# Patient Record
Sex: FEMALE | Race: WHITE | NOT HISPANIC OR LATINO | URBAN - METROPOLITAN AREA
[De-identification: names, ages, dates, MRNs, and addresses within clinical notes are randomized per-mention and may not be internally consistent; named-entity substitution may affect disease eponyms.]

---

## 2019-01-01 ENCOUNTER — OUTPATIENT (OUTPATIENT)
Dept: OUTPATIENT SERVICES | Facility: HOSPITAL | Age: 44
LOS: 1 days | End: 2019-01-01
Payer: MEDICAID

## 2019-01-01 PROCEDURE — G9001: CPT

## 2019-01-10 ENCOUNTER — EMERGENCY (EMERGENCY)
Facility: HOSPITAL | Age: 44
LOS: 1 days | Discharge: HOME | End: 2019-01-10
Attending: EMERGENCY MEDICINE | Admitting: EMERGENCY MEDICINE
Payer: MEDICAID

## 2019-01-10 VITALS
HEART RATE: 66 BPM | SYSTOLIC BLOOD PRESSURE: 119 MMHG | DIASTOLIC BLOOD PRESSURE: 78 MMHG | RESPIRATION RATE: 14 BRPM | TEMPERATURE: 98 F | OXYGEN SATURATION: 99 %

## 2019-01-10 VITALS
DIASTOLIC BLOOD PRESSURE: 81 MMHG | HEART RATE: 70 BPM | WEIGHT: 121.03 LBS | SYSTOLIC BLOOD PRESSURE: 127 MMHG | OXYGEN SATURATION: 100 % | RESPIRATION RATE: 16 BRPM | TEMPERATURE: 98 F

## 2019-01-10 LAB
ALBUMIN SERPL ELPH-MCNC: 3.6 G/DL — SIGNIFICANT CHANGE UP (ref 3.3–5)
ALP SERPL-CCNC: 78 U/L — SIGNIFICANT CHANGE UP (ref 40–120)
ALT FLD-CCNC: 20 U/L — SIGNIFICANT CHANGE UP (ref 12–78)
ANION GAP SERPL CALC-SCNC: 7 MMOL/L — SIGNIFICANT CHANGE UP (ref 5–17)
AST SERPL-CCNC: 19 U/L — SIGNIFICANT CHANGE UP (ref 15–37)
BASOPHILS # BLD AUTO: 0.02 K/UL — SIGNIFICANT CHANGE UP (ref 0–0.2)
BASOPHILS NFR BLD AUTO: 0.4 % — SIGNIFICANT CHANGE UP (ref 0–2)
BILIRUB SERPL-MCNC: 0.2 MG/DL — SIGNIFICANT CHANGE UP (ref 0.2–1.2)
BUN SERPL-MCNC: 15 MG/DL — SIGNIFICANT CHANGE UP (ref 7–23)
CALCIUM SERPL-MCNC: 8.2 MG/DL — LOW (ref 8.5–10.1)
CARBAMAZEPINE SERPL-MCNC: 13.6 UG/ML — HIGH (ref 4–12)
CHLORIDE SERPL-SCNC: 104 MMOL/L — SIGNIFICANT CHANGE UP (ref 96–108)
CO2 SERPL-SCNC: 27 MMOL/L — SIGNIFICANT CHANGE UP (ref 22–31)
CREAT SERPL-MCNC: 0.6 MG/DL — SIGNIFICANT CHANGE UP (ref 0.5–1.3)
EOSINOPHIL # BLD AUTO: 0.09 K/UL — SIGNIFICANT CHANGE UP (ref 0–0.5)
EOSINOPHIL NFR BLD AUTO: 2 % — SIGNIFICANT CHANGE UP (ref 0–6)
GLUCOSE SERPL-MCNC: 100 MG/DL — HIGH (ref 70–99)
HCT VFR BLD CALC: 36.7 % — SIGNIFICANT CHANGE UP (ref 34.5–45)
HGB BLD-MCNC: 12.5 G/DL — SIGNIFICANT CHANGE UP (ref 11.5–15.5)
IMM GRANULOCYTES NFR BLD AUTO: 0.7 % — SIGNIFICANT CHANGE UP (ref 0–1.5)
LYMPHOCYTES # BLD AUTO: 1.27 K/UL — SIGNIFICANT CHANGE UP (ref 1–3.3)
LYMPHOCYTES # BLD AUTO: 28.5 % — SIGNIFICANT CHANGE UP (ref 13–44)
MCHC RBC-ENTMCNC: 31 PG — SIGNIFICANT CHANGE UP (ref 27–34)
MCHC RBC-ENTMCNC: 34.1 GM/DL — SIGNIFICANT CHANGE UP (ref 32–36)
MCV RBC AUTO: 91.1 FL — SIGNIFICANT CHANGE UP (ref 80–100)
MONOCYTES # BLD AUTO: 0.48 K/UL — SIGNIFICANT CHANGE UP (ref 0–0.9)
MONOCYTES NFR BLD AUTO: 10.8 % — SIGNIFICANT CHANGE UP (ref 2–14)
NEUTROPHILS # BLD AUTO: 2.57 K/UL — SIGNIFICANT CHANGE UP (ref 1.8–7.4)
NEUTROPHILS NFR BLD AUTO: 57.6 % — SIGNIFICANT CHANGE UP (ref 43–77)
PLATELET # BLD AUTO: 285 K/UL — SIGNIFICANT CHANGE UP (ref 150–400)
POTASSIUM SERPL-MCNC: 3.9 MMOL/L — SIGNIFICANT CHANGE UP (ref 3.5–5.3)
POTASSIUM SERPL-SCNC: 3.9 MMOL/L — SIGNIFICANT CHANGE UP (ref 3.5–5.3)
PROT SERPL-MCNC: 7.6 G/DL — SIGNIFICANT CHANGE UP (ref 6–8.3)
RBC # BLD: 4.03 M/UL — SIGNIFICANT CHANGE UP (ref 3.8–5.2)
RBC # FLD: 13 % — SIGNIFICANT CHANGE UP (ref 10.3–14.5)
SODIUM SERPL-SCNC: 138 MMOL/L — SIGNIFICANT CHANGE UP (ref 135–145)
WBC # BLD: 4.46 K/UL — SIGNIFICANT CHANGE UP (ref 3.8–10.5)
WBC # FLD AUTO: 4.46 K/UL — SIGNIFICANT CHANGE UP (ref 3.8–10.5)

## 2019-01-10 PROCEDURE — 99284 EMERGENCY DEPT VISIT MOD MDM: CPT

## 2019-01-10 PROCEDURE — 93005 ELECTROCARDIOGRAM TRACING: CPT

## 2019-01-10 PROCEDURE — 70450 CT HEAD/BRAIN W/O DYE: CPT

## 2019-01-10 PROCEDURE — 36415 COLL VENOUS BLD VENIPUNCTURE: CPT

## 2019-01-10 PROCEDURE — 70450 CT HEAD/BRAIN W/O DYE: CPT | Mod: 26

## 2019-01-10 PROCEDURE — 99284 EMERGENCY DEPT VISIT MOD MDM: CPT | Mod: 25

## 2019-01-10 PROCEDURE — 80177 DRUG SCRN QUAN LEVETIRACETAM: CPT

## 2019-01-10 PROCEDURE — 80156 ASSAY CARBAMAZEPINE TOTAL: CPT

## 2019-01-10 PROCEDURE — 85027 COMPLETE CBC AUTOMATED: CPT

## 2019-01-10 PROCEDURE — 80053 COMPREHEN METABOLIC PANEL: CPT

## 2019-01-10 NOTE — ED PROVIDER NOTE - ATTENDING CONTRIBUTION TO CARE
Pt Is a 42 yo female who presents to the ED with cc of tingling sensation to right lower leg.  PMHx of epilepsy, h/o prior "silent stroke."  Pt reports that last night she believes that she took an extra dose of her Carbamazepine.  This was not intentional.  She reports that this morning she awoke with a tingling sensation down her right leg and pt reports that she felt off balance when walking.  She reports that last year she suffered a "silent stroke" and states that it resulted in decreased sensation to her right leg with difficulty walking.  Pt reports that she spent time in PT and reports that she had no residual effects after this.  Pt spoke with her neurologist and was advised to come to the ED for further work up.  Pt states that her balance has since returned to normal but reports she still has a tingling sensation to her right leg although it has improved from the entire leg to just her lower posterior calf.  Denies HA, visual changes, N/V, CP, SOB, abd pain.  On exam pt resting in bed, NCAT, PERRL, EOMI, heart RRR, lungs CTA, abd soft NT/ND.  CN II-XII intact with no focal deficits noted.  Pt subjectively reports tingling to right lower posterior calf but with intact sensation.  No rashes noted.  +pedal pulse, cap refill less then 2 seconds.  Pt ambulatory without ataxia.  Will obtain screening labs, check drug levels, CT head, and contact tox fellow.  Agree with above plan of care

## 2019-01-10 NOTE — ED PROVIDER NOTE - OBJECTIVE STATEMENT
pt is a 44yo female with pmhx of epilepsy on keppra and carbamazepine presents for "blood work". pt reports by mistake she took an extra dose of carbamazepine- usually takes 600mg at night but took 1200, once dose at 8p and one dose at 11p. pt reports she forgot she took it and took medication again. pt reports r lower leg tingling. pt without any other complaints. pt consulted neurologist, dr Spicer who advised pt to come to ed for labs.

## 2019-01-10 NOTE — ED PROVIDER NOTE - NSFOLLOWUPINSTRUCTIONS_ED_ALL_ED_FT
1. FOLLOW UP WITH YOUR PRIMARY DOCTOR IN 24-48 HOURS.   2. FOLLOW UP WITH ALL SPECALIST DISCUSSED DURING YOUR VISIT.   3. TAKE ALL MEDICATIONS PRESCRIBED IN THE ER.   4. RETURN FOR WORSENING SYMPTOMS INCLUDING BUT NOT LIMITED TO FEVER, CHEST PAIN, OR TROUBLE BREATHING.  5. SKIP TEGRETOL DOSE TODAY AND START AGAIN TOMORROW.

## 2019-01-10 NOTE — ED PROVIDER NOTE - CHPI ED SYMPTOMS NEG
no vomiting/no nausea/no fever/no headache/no loss of consciousness/no pain/no back pain/no dizziness/no decreased eating/drinking

## 2019-01-10 NOTE — ED ADULT NURSE NOTE - NSIMPLEMENTINTERV_GEN_ALL_ED
Implemented All Universal Safety Interventions:  [place] to call system. Call bell, personal items and telephone within reach. Instruct patient to call for assistance. Room bathroom lighting operational. Non-slip footwear when patient is off stretcher. Physically safe environment: no spills, clutter or unnecessary equipment. Stretcher in lowest position, wheels locked, appropriate side rails in place.

## 2019-01-10 NOTE — ED PROVIDER NOTE - PROGRESS NOTE DETAILS
spoke to tox fellow dr. alena Cagle who advised to do ekg and labs, and will re-eval  pager [phone] ct reviewed. consulted neuro dr hodgson who advised that pt can follow up with her neurologist. consulted pt neuro pending call back. dr copeland tox fellow advised pt cleared from toxicology standpoint. consulted dr gomes neuro who advised findings on ct are old and similar to previous mri in 2/2017. advised pt with old infarct and cyst. advised pt to skip today dose of carbamazepine and follow up in office. All imaging and labs reviewed. all results reviewed with pt including abnormal results. pt given a copy of results. pt advised to follow up with pmd regarding abnormal results. All questions answered and concerns addressed. pt verbalized understanding and agreement with plan and dx. pt advised on next step and when/where to follow up. pt advised on all take home and otc medications. pt advised to follow up with PMD. pt advised to return to ed for worsenng symptoms including fever, cp, sob. will dc.

## 2019-01-10 NOTE — ED PROVIDER NOTE - MEDICAL DECISION MAKING DETAILS
draw level and consulted tox fellow, pending call back draw level, labs, ekg, ct head, and consulted tox fellow, pending call back

## 2019-01-11 DIAGNOSIS — Z71.89 OTHER SPECIFIED COUNSELING: ICD-10-CM

## 2019-01-12 LAB — LEVETIRACETAM SERPL-MCNC: 11.6 MCG/ML — LOW (ref 12–46)

## 2019-02-07 PROBLEM — G40.909 EPILEPSY, UNSPECIFIED, NOT INTRACTABLE, WITHOUT STATUS EPILEPTICUS: Chronic | Status: ACTIVE | Noted: 2019-01-10

## 2019-03-15 ENCOUNTER — OUTPATIENT (OUTPATIENT)
Dept: OUTPATIENT SERVICES | Facility: HOSPITAL | Age: 44
LOS: 1 days | Discharge: HOME | End: 2019-03-15
Payer: MEDICAID

## 2019-03-15 PROCEDURE — 95816 EEG AWAKE AND DROWSY: CPT | Mod: 26

## 2019-03-16 PROCEDURE — 95953: CPT | Mod: 26

## 2019-03-20 DIAGNOSIS — G40.909 EPILEPSY, UNSPECIFIED, NOT INTRACTABLE, WITHOUT STATUS EPILEPTICUS: ICD-10-CM

## 2019-04-22 RX ORDER — OXYCODONE HYDROCHLORIDE 5 MG/1
5 TABLET ORAL ONCE
Qty: 0 | Refills: 0 | Status: DISCONTINUED | OUTPATIENT
Start: 2019-04-23 | End: 2019-04-25

## 2019-04-22 RX ORDER — FENTANYL CITRATE 50 UG/ML
50 INJECTION INTRAVENOUS
Qty: 0 | Refills: 0 | Status: DISCONTINUED | OUTPATIENT
Start: 2019-04-23 | End: 2019-04-25

## 2019-04-22 RX ORDER — ONDANSETRON 8 MG/1
4 TABLET, FILM COATED ORAL ONCE
Qty: 0 | Refills: 0 | Status: DISCONTINUED | OUTPATIENT
Start: 2019-04-23 | End: 2019-04-25

## 2019-04-22 RX ORDER — SODIUM CHLORIDE 9 MG/ML
1000 INJECTION, SOLUTION INTRAVENOUS
Qty: 0 | Refills: 0 | Status: DISCONTINUED | OUTPATIENT
Start: 2019-04-23 | End: 2019-04-25

## 2019-04-23 ENCOUNTER — OUTPATIENT (OUTPATIENT)
Dept: OUTPATIENT SERVICES | Facility: HOSPITAL | Age: 44
LOS: 1 days | Discharge: HOME | End: 2019-04-23
Payer: MEDICAID

## 2019-04-23 ENCOUNTER — RESULT REVIEW (OUTPATIENT)
Age: 44
End: 2019-04-23

## 2019-04-23 VITALS
SYSTOLIC BLOOD PRESSURE: 112 MMHG | OXYGEN SATURATION: 100 % | RESPIRATION RATE: 16 BRPM | HEART RATE: 65 BPM | HEIGHT: 64 IN | TEMPERATURE: 98 F | DIASTOLIC BLOOD PRESSURE: 65 MMHG | WEIGHT: 119.93 LBS

## 2019-04-23 VITALS
DIASTOLIC BLOOD PRESSURE: 68 MMHG | HEART RATE: 73 BPM | SYSTOLIC BLOOD PRESSURE: 103 MMHG | TEMPERATURE: 99 F | RESPIRATION RATE: 16 BRPM | OXYGEN SATURATION: 100 %

## 2019-04-23 LAB
ABO RH CONFIRMATION: SIGNIFICANT CHANGE UP
BLD GP AB SCN SERPL QL: SIGNIFICANT CHANGE UP
HCG UR QL: NEGATIVE — SIGNIFICANT CHANGE UP
HCT VFR BLD CALC: 33.6 % — LOW (ref 34.5–45)
HGB BLD-MCNC: 11.2 G/DL — LOW (ref 11.5–15.5)
MCHC RBC-ENTMCNC: 30.8 PG — SIGNIFICANT CHANGE UP (ref 27–34)
MCHC RBC-ENTMCNC: 33.3 GM/DL — SIGNIFICANT CHANGE UP (ref 32–36)
MCV RBC AUTO: 92.3 FL — SIGNIFICANT CHANGE UP (ref 80–100)
NRBC # BLD: 0 /100 WBCS — SIGNIFICANT CHANGE UP (ref 0–0)
PLATELET # BLD AUTO: 226 K/UL — SIGNIFICANT CHANGE UP (ref 150–400)
RBC # BLD: 3.64 M/UL — LOW (ref 3.8–5.2)
RBC # FLD: 12.8 % — SIGNIFICANT CHANGE UP (ref 10.3–14.5)
TYPE + AB SCN PNL BLD: SIGNIFICANT CHANGE UP
WBC # BLD: 7.43 K/UL — SIGNIFICANT CHANGE UP (ref 3.8–10.5)
WBC # FLD AUTO: 7.43 K/UL — SIGNIFICANT CHANGE UP (ref 3.8–10.5)

## 2019-04-23 PROCEDURE — 88305 TISSUE EXAM BY PATHOLOGIST: CPT | Mod: 26

## 2019-04-23 RX ORDER — CARBAMAZEPINE 200 MG
0 TABLET ORAL
Qty: 0 | Refills: 0 | COMMUNITY

## 2019-04-23 RX ORDER — LEVETIRACETAM 250 MG/1
0 TABLET, FILM COATED ORAL
Qty: 0 | Refills: 0 | COMMUNITY

## 2019-04-23 RX ORDER — ACETAMINOPHEN 500 MG
975 TABLET ORAL ONCE
Qty: 0 | Refills: 0 | Status: COMPLETED | OUTPATIENT
Start: 2019-04-23 | End: 2019-04-23

## 2019-04-23 RX ORDER — FAMOTIDINE 10 MG/ML
20 INJECTION INTRAVENOUS ONCE
Qty: 0 | Refills: 0 | Status: COMPLETED | OUTPATIENT
Start: 2019-04-23 | End: 2019-04-23

## 2019-04-23 RX ADMIN — Medication 975 MILLIGRAM(S): at 12:23

## 2019-04-23 RX ADMIN — FAMOTIDINE 20 MILLIGRAM(S): 10 INJECTION INTRAVENOUS at 12:23

## 2019-04-23 NOTE — BRIEF OPERATIVE NOTE - NSICDXBRIEFPREOP_GEN_ALL_CORE_FT
PRE-OP DIAGNOSIS:  Polymenorrhea 23-Apr-2019 15:48:52  Hira Guaman  Abnormal uterine bleeding (AUB) 23-Apr-2019 15:48:44  Hira Guaman

## 2019-04-23 NOTE — ASU PATIENT PROFILE, ADULT - TEACHING/LEARNING LEARNING PREFERENCES
individual instruction/written material/group instruction/skill demonstration/verbal instruction/audio/computer/internet/pictorial/video

## 2019-04-23 NOTE — BRIEF OPERATIVE NOTE - NSICDXBRIEFPROCEDURE_GEN_ALL_CORE_FT
PROCEDURES:  Fractional dilation and curettage of uterus 23-Apr-2019 15:48:26  Hira Guaman  Diagnostic hysteroscopy 23-Apr-2019 15:48:18  Hira Guaman  Exam under anesthesia, gynecologic 23-Apr-2019 15:48:08  Hira Guaman

## 2019-04-23 NOTE — ASU DISCHARGE PLAN (ADULT/PEDIATRIC) - CARE PROVIDER_API CALL
Hira Guaman)  Obstetrics and Gynecology  [address] [place] [address]  Phone: [phone]  Fax: [phone]  Follow Up Time: 2 weeks

## 2019-04-25 LAB — SURGICAL PATHOLOGY STUDY: SIGNIFICANT CHANGE UP

## 2019-04-29 DIAGNOSIS — N85.4 MALPOSITION OF UTERUS: ICD-10-CM

## 2019-04-29 DIAGNOSIS — N87.9 DYSPLASIA OF CERVIX UTERI, UNSPECIFIED: ICD-10-CM

## 2019-04-29 DIAGNOSIS — N92.0 EXCESSIVE AND FREQUENT MENSTRUATION WITH REGULAR CYCLE: ICD-10-CM

## 2019-04-29 DIAGNOSIS — N84.0 POLYP OF CORPUS UTERI: ICD-10-CM

## 2019-04-29 DIAGNOSIS — N72 INFLAMMATORY DISEASE OF CERVIX UTERI: ICD-10-CM

## 2019-04-29 DIAGNOSIS — N93.9 ABNORMAL UTERINE AND VAGINAL BLEEDING, UNSPECIFIED: ICD-10-CM

## 2019-04-29 DIAGNOSIS — R56.9 UNSPECIFIED CONVULSIONS: ICD-10-CM

## 2019-11-25 NOTE — ED ADULT TRIAGE NOTE - ESI TRIAGE ACUITY LEVEL, MLM
4 Patient signed out to me by Dr. Allen at 12am. awaiting sobriety.   At this time patient's  Mr. Carter [phone] presented to ED requesting staff does not discharge patient. He reports she drinks alcohol to hurt herself and he is concerned for her safety. I discussed with him that psychiatry would be consulted if suicidal gestures are a concern after reevaluation of patient. TAVO Alvarez MD On reeval spoke to patient regarding her husbands concerns but patient reports that she is depressed and drinks due to her depression but she does not which to hurt helfself in any way. Denies any overdosing on medications. Reports she is noncomplaint with the Celexa she has been prescribe din past but psychiatry. I asked patient if she is interested in speaking to psychiatry and she reports she does not want to speak to them at this time. Patient agrees to speak to  regarding her alcohol abuse issues.  Called patient's  to tell him patient refused psychiatry evaluation and he reports he will not  patient and is pressing patient needs to be admitted.   At this time will await SW evaluation. patient signedout to Dr. Whitmore at 8am. TAVO Alvarez MD pt seen by social work,  clear for d/c home will be picked up by

## 2021-04-05 ENCOUNTER — APPOINTMENT (OUTPATIENT)
Dept: GASTROENTEROLOGY | Facility: CLINIC | Age: 46
End: 2021-04-05

## 2021-06-28 ENCOUNTER — APPOINTMENT (OUTPATIENT)
Dept: GASTROENTEROLOGY | Facility: CLINIC | Age: 46
End: 2021-06-28

## 2022-01-02 ENCOUNTER — TRANSCRIPTION ENCOUNTER (OUTPATIENT)
Age: 47
End: 2022-01-02

## 2022-02-12 ENCOUNTER — TRANSCRIPTION ENCOUNTER (OUTPATIENT)
Age: 47
End: 2022-02-12

## 2022-02-13 ENCOUNTER — INPATIENT (INPATIENT)
Facility: HOSPITAL | Age: 47
LOS: 2 days | Discharge: HOME | DRG: 871 | End: 2022-02-16
Attending: HOSPITALIST | Admitting: HOSPITALIST
Payer: MEDICAID

## 2022-02-13 VITALS
TEMPERATURE: 99 F | HEART RATE: 97 BPM | HEIGHT: 64 IN | DIASTOLIC BLOOD PRESSURE: 78 MMHG | OXYGEN SATURATION: 100 % | WEIGHT: 119.93 LBS | RESPIRATION RATE: 16 BRPM | SYSTOLIC BLOOD PRESSURE: 124 MMHG

## 2022-02-13 DIAGNOSIS — A41.9 SEPSIS, UNSPECIFIED ORGANISM: ICD-10-CM

## 2022-02-13 DIAGNOSIS — N20.1 CALCULUS OF URETER: ICD-10-CM

## 2022-02-13 DIAGNOSIS — A41.50 GRAM-NEGATIVE SEPSIS, UNSPECIFIED: ICD-10-CM

## 2022-02-13 LAB
ALBUMIN SERPL ELPH-MCNC: 3.7 G/DL — SIGNIFICANT CHANGE UP (ref 3.3–5)
ALP SERPL-CCNC: 73 U/L — SIGNIFICANT CHANGE UP (ref 40–120)
ALT FLD-CCNC: 16 U/L — SIGNIFICANT CHANGE UP (ref 12–78)
ANION GAP SERPL CALC-SCNC: 6 MMOL/L — SIGNIFICANT CHANGE UP (ref 5–17)
APPEARANCE UR: ABNORMAL
APTT BLD: 24.5 SEC — LOW (ref 27.5–35.5)
AST SERPL-CCNC: 23 U/L — SIGNIFICANT CHANGE UP (ref 15–37)
BACTERIA # UR AUTO: ABNORMAL
BASOPHILS # BLD AUTO: 0.02 K/UL — SIGNIFICANT CHANGE UP (ref 0–0.2)
BASOPHILS NFR BLD AUTO: 0.1 % — SIGNIFICANT CHANGE UP (ref 0–2)
BILIRUB SERPL-MCNC: 0.6 MG/DL — SIGNIFICANT CHANGE UP (ref 0.2–1.2)
BILIRUB UR-MCNC: NEGATIVE — SIGNIFICANT CHANGE UP
BLD GP AB SCN SERPL QL: SIGNIFICANT CHANGE UP
BUN SERPL-MCNC: 14 MG/DL — SIGNIFICANT CHANGE UP (ref 7–23)
CALCIUM SERPL-MCNC: 8.6 MG/DL — SIGNIFICANT CHANGE UP (ref 8.5–10.1)
CARBAMAZEPINE SERPL-MCNC: 12.8 UG/ML — SIGNIFICANT CHANGE UP (ref 4–12)
CHLORIDE SERPL-SCNC: 101 MMOL/L — SIGNIFICANT CHANGE UP (ref 96–108)
CO2 SERPL-SCNC: 27 MMOL/L — SIGNIFICANT CHANGE UP (ref 22–31)
COLOR SPEC: YELLOW — SIGNIFICANT CHANGE UP
CREAT SERPL-MCNC: 1.1 MG/DL — SIGNIFICANT CHANGE UP (ref 0.5–1.3)
DIFF PNL FLD: ABNORMAL
EOSINOPHIL # BLD AUTO: 0 K/UL — SIGNIFICANT CHANGE UP (ref 0–0.5)
EOSINOPHIL NFR BLD AUTO: 0 % — SIGNIFICANT CHANGE UP (ref 0–6)
EPI CELLS # UR: ABNORMAL
GLUCOSE SERPL-MCNC: 114 MG/DL — HIGH (ref 70–99)
GLUCOSE UR QL: NEGATIVE — SIGNIFICANT CHANGE UP
HCG SERPL-ACNC: <1 MIU/ML — SIGNIFICANT CHANGE UP
HCT VFR BLD CALC: 38.3 % — SIGNIFICANT CHANGE UP (ref 34.5–45)
HGB BLD-MCNC: 13.2 G/DL — SIGNIFICANT CHANGE UP (ref 11.5–15.5)
IMM GRANULOCYTES NFR BLD AUTO: 0.6 % — SIGNIFICANT CHANGE UP (ref 0–1.5)
INR BLD: 1.16 RATIO — SIGNIFICANT CHANGE UP (ref 0.88–1.16)
KETONES UR-MCNC: ABNORMAL
LACTATE SERPL-SCNC: 1 MMOL/L — SIGNIFICANT CHANGE UP (ref 0.7–2)
LEUKOCYTE ESTERASE UR-ACNC: ABNORMAL
LIDOCAIN IGE QN: 68 U/L — LOW (ref 73–393)
LYMPHOCYTES # BLD AUTO: 0.45 K/UL — LOW (ref 1–3.3)
LYMPHOCYTES # BLD AUTO: 2.8 % — LOW (ref 13–44)
MANUAL SMEAR VERIFICATION: SIGNIFICANT CHANGE UP
MCHC RBC-ENTMCNC: 30.9 PG — SIGNIFICANT CHANGE UP (ref 27–34)
MCHC RBC-ENTMCNC: 34.5 GM/DL — SIGNIFICANT CHANGE UP (ref 32–36)
MCV RBC AUTO: 89.7 FL — SIGNIFICANT CHANGE UP (ref 80–100)
MONOCYTES # BLD AUTO: 0.52 K/UL — SIGNIFICANT CHANGE UP (ref 0–0.9)
MONOCYTES NFR BLD AUTO: 3.2 % — SIGNIFICANT CHANGE UP (ref 2–14)
NEUTROPHILS # BLD AUTO: 15.23 K/UL — HIGH (ref 1.8–7.4)
NEUTROPHILS NFR BLD AUTO: 93.3 % — HIGH (ref 43–77)
NITRITE UR-MCNC: NEGATIVE — SIGNIFICANT CHANGE UP
NRBC # BLD: 0 /100 WBCS — SIGNIFICANT CHANGE UP (ref 0–0)
PH UR: 5 — SIGNIFICANT CHANGE UP (ref 5–8)
PLAT MORPH BLD: NORMAL — SIGNIFICANT CHANGE UP
PLATELET # BLD AUTO: 245 K/UL — SIGNIFICANT CHANGE UP (ref 150–400)
POTASSIUM SERPL-MCNC: 4.2 MMOL/L — SIGNIFICANT CHANGE UP (ref 3.5–5.3)
POTASSIUM SERPL-SCNC: 4.2 MMOL/L — SIGNIFICANT CHANGE UP (ref 3.5–5.3)
PROT SERPL-MCNC: 8.1 G/DL — SIGNIFICANT CHANGE UP (ref 6–8.3)
PROT UR-MCNC: 30 MG/DL
PROTHROM AB SERPL-ACNC: 13.5 SEC — SIGNIFICANT CHANGE UP (ref 10.6–13.6)
RBC # BLD: 4.27 M/UL — SIGNIFICANT CHANGE UP (ref 3.8–5.2)
RBC # FLD: 12.9 % — SIGNIFICANT CHANGE UP (ref 10.3–14.5)
RBC BLD AUTO: NORMAL — SIGNIFICANT CHANGE UP
RBC CASTS # UR COMP ASSIST: ABNORMAL /HPF (ref 0–4)
SARS-COV-2 RNA SPEC QL NAA+PROBE: DETECTED
SODIUM SERPL-SCNC: 134 MMOL/L — LOW (ref 135–145)
SP GR SPEC: 1.02 — SIGNIFICANT CHANGE UP (ref 1.01–1.02)
UROBILINOGEN FLD QL: NEGATIVE — SIGNIFICANT CHANGE UP
WBC # BLD: 16.31 K/UL — HIGH (ref 3.8–10.5)
WBC # FLD AUTO: 16.31 K/UL — HIGH (ref 3.8–10.5)
WBC UR QL: >50

## 2022-02-13 PROCEDURE — 74176 CT ABD & PELVIS W/O CONTRAST: CPT | Mod: 26,MA

## 2022-02-13 PROCEDURE — 99285 EMERGENCY DEPT VISIT HI MDM: CPT

## 2022-02-13 PROCEDURE — 99223 1ST HOSP IP/OBS HIGH 75: CPT

## 2022-02-13 PROCEDURE — 71046 X-RAY EXAM CHEST 2 VIEWS: CPT | Mod: 26

## 2022-02-13 PROCEDURE — 93010 ELECTROCARDIOGRAM REPORT: CPT

## 2022-02-13 PROCEDURE — 99221 1ST HOSP IP/OBS SF/LOW 40: CPT

## 2022-02-13 DEVICE — URETERAL STENT CONTOUR VL 4.8FR 22-30CM
Type: IMPLANTABLE DEVICE | Site: LEFT | Status: NON-FUNCTIONAL
Removed: 2022-02-13

## 2022-02-13 DEVICE — URETERAL CATH OPEN END FLEXI-TIP 5FR .038" X 70CM
Type: IMPLANTABLE DEVICE | Site: LEFT | Status: NON-FUNCTIONAL
Removed: 2022-02-13

## 2022-02-13 DEVICE — GUIDEWIRE SENSOR DUAL-FLEX NITINOL STRAIGHT .035" X 150CM
Type: IMPLANTABLE DEVICE | Site: LEFT | Status: NON-FUNCTIONAL
Removed: 2022-02-13

## 2022-02-13 RX ORDER — OXYCODONE AND ACETAMINOPHEN 5; 325 MG/1; MG/1
2 TABLET ORAL EVERY 6 HOURS
Refills: 0 | Status: DISCONTINUED | OUTPATIENT
Start: 2022-02-13 | End: 2022-02-13

## 2022-02-13 RX ORDER — SODIUM CHLORIDE 9 MG/ML
1000 INJECTION INTRAMUSCULAR; INTRAVENOUS; SUBCUTANEOUS ONCE
Refills: 0 | Status: COMPLETED | OUTPATIENT
Start: 2022-02-13 | End: 2022-02-13

## 2022-02-13 RX ORDER — CARBAMAZEPINE 200 MG
400 TABLET ORAL
Refills: 0 | Status: DISCONTINUED | OUTPATIENT
Start: 2022-02-13 | End: 2022-02-13

## 2022-02-13 RX ORDER — CARBAMAZEPINE 200 MG
500 TABLET ORAL
Refills: 0 | Status: DISCONTINUED | OUTPATIENT
Start: 2022-02-13 | End: 2022-02-16

## 2022-02-13 RX ORDER — SODIUM CHLORIDE 9 MG/ML
1000 INJECTION, SOLUTION INTRAVENOUS
Refills: 0 | Status: DISCONTINUED | OUTPATIENT
Start: 2022-02-13 | End: 2022-02-13

## 2022-02-13 RX ORDER — HYDROMORPHONE HYDROCHLORIDE 2 MG/ML
0.5 INJECTION INTRAMUSCULAR; INTRAVENOUS; SUBCUTANEOUS
Refills: 0 | Status: DISCONTINUED | OUTPATIENT
Start: 2022-02-13 | End: 2022-02-13

## 2022-02-13 RX ORDER — ACETAMINOPHEN 500 MG
650 TABLET ORAL EVERY 6 HOURS
Refills: 0 | Status: DISCONTINUED | OUTPATIENT
Start: 2022-02-13 | End: 2022-02-13

## 2022-02-13 RX ORDER — PHENAZOPYRIDINE HCL 100 MG
200 TABLET ORAL THREE TIMES A DAY
Refills: 0 | Status: DISCONTINUED | OUTPATIENT
Start: 2022-02-13 | End: 2022-02-16

## 2022-02-13 RX ORDER — SODIUM CHLORIDE 9 MG/ML
1000 INJECTION INTRAMUSCULAR; INTRAVENOUS; SUBCUTANEOUS
Refills: 0 | Status: DISCONTINUED | OUTPATIENT
Start: 2022-02-13 | End: 2022-02-13

## 2022-02-13 RX ORDER — ONDANSETRON 8 MG/1
4 TABLET, FILM COATED ORAL EVERY 8 HOURS
Refills: 0 | Status: DISCONTINUED | OUTPATIENT
Start: 2022-02-13 | End: 2022-02-13

## 2022-02-13 RX ORDER — CARBAMAZEPINE 200 MG
400 TABLET ORAL
Refills: 0 | Status: DISCONTINUED | OUTPATIENT
Start: 2022-02-13 | End: 2022-02-16

## 2022-02-13 RX ORDER — LEVETIRACETAM 250 MG/1
1000 TABLET, FILM COATED ORAL
Refills: 0 | Status: DISCONTINUED | OUTPATIENT
Start: 2022-02-13 | End: 2022-02-13

## 2022-02-13 RX ORDER — LANOLIN ALCOHOL/MO/W.PET/CERES
3 CREAM (GRAM) TOPICAL AT BEDTIME
Refills: 0 | Status: DISCONTINUED | OUTPATIENT
Start: 2022-02-13 | End: 2022-02-13

## 2022-02-13 RX ORDER — OXYCODONE AND ACETAMINOPHEN 5; 325 MG/1; MG/1
1 TABLET ORAL EVERY 6 HOURS
Refills: 0 | Status: DISCONTINUED | OUTPATIENT
Start: 2022-02-13 | End: 2022-02-13

## 2022-02-13 RX ORDER — CARBAMAZEPINE 200 MG
600 TABLET ORAL
Refills: 0 | Status: DISCONTINUED | OUTPATIENT
Start: 2022-02-13 | End: 2022-02-16

## 2022-02-13 RX ORDER — CEFTRIAXONE 500 MG/1
1000 INJECTION, POWDER, FOR SOLUTION INTRAMUSCULAR; INTRAVENOUS ONCE
Refills: 0 | Status: COMPLETED | OUTPATIENT
Start: 2022-02-13 | End: 2022-02-13

## 2022-02-13 RX ORDER — CEFTRIAXONE 500 MG/1
1000 INJECTION, POWDER, FOR SOLUTION INTRAMUSCULAR; INTRAVENOUS EVERY 24 HOURS
Refills: 0 | Status: DISCONTINUED | OUTPATIENT
Start: 2022-02-13 | End: 2022-02-13

## 2022-02-13 RX ORDER — SODIUM CHLORIDE 9 MG/ML
1700 INJECTION INTRAMUSCULAR; INTRAVENOUS; SUBCUTANEOUS ONCE
Refills: 0 | Status: COMPLETED | OUTPATIENT
Start: 2022-02-13 | End: 2022-02-13

## 2022-02-13 RX ORDER — CARBAMAZEPINE 200 MG
500 TABLET ORAL
Refills: 0 | Status: DISCONTINUED | OUTPATIENT
Start: 2022-02-13 | End: 2022-02-13

## 2022-02-13 RX ORDER — CARBAMAZEPINE 200 MG
600 TABLET ORAL
Refills: 0 | Status: DISCONTINUED | OUTPATIENT
Start: 2022-02-13 | End: 2022-02-13

## 2022-02-13 RX ORDER — OXYCODONE HYDROCHLORIDE 5 MG/1
5 TABLET ORAL ONCE
Refills: 0 | Status: DISCONTINUED | OUTPATIENT
Start: 2022-02-13 | End: 2022-02-13

## 2022-02-13 RX ORDER — LEVETIRACETAM 250 MG/1
1000 TABLET, FILM COATED ORAL
Refills: 0 | Status: DISCONTINUED | OUTPATIENT
Start: 2022-02-13 | End: 2022-02-16

## 2022-02-13 RX ORDER — ACETAMINOPHEN 500 MG
650 TABLET ORAL EVERY 6 HOURS
Refills: 0 | Status: DISCONTINUED | OUTPATIENT
Start: 2022-02-13 | End: 2022-02-16

## 2022-02-13 RX ORDER — ENOXAPARIN SODIUM 100 MG/ML
40 INJECTION SUBCUTANEOUS DAILY
Refills: 0 | Status: DISCONTINUED | OUTPATIENT
Start: 2022-02-14 | End: 2022-02-16

## 2022-02-13 RX ORDER — CARBAMAZEPINE 200 MG
2.5 TABLET ORAL
Qty: 0 | Refills: 0 | DISCHARGE

## 2022-02-13 RX ORDER — OXYCODONE AND ACETAMINOPHEN 5; 325 MG/1; MG/1
1 TABLET ORAL EVERY 6 HOURS
Refills: 0 | Status: DISCONTINUED | OUTPATIENT
Start: 2022-02-13 | End: 2022-02-16

## 2022-02-13 RX ORDER — KETOROLAC TROMETHAMINE 30 MG/ML
30 SYRINGE (ML) INJECTION ONCE
Refills: 0 | Status: DISCONTINUED | OUTPATIENT
Start: 2022-02-13 | End: 2022-02-13

## 2022-02-13 RX ORDER — CEFTRIAXONE 500 MG/1
1000 INJECTION, POWDER, FOR SOLUTION INTRAMUSCULAR; INTRAVENOUS EVERY 24 HOURS
Refills: 0 | Status: COMPLETED | OUTPATIENT
Start: 2022-02-14 | End: 2022-02-16

## 2022-02-13 RX ORDER — ONDANSETRON 8 MG/1
4 TABLET, FILM COATED ORAL ONCE
Refills: 0 | Status: DISCONTINUED | OUTPATIENT
Start: 2022-02-13 | End: 2022-02-13

## 2022-02-13 RX ORDER — OXYCODONE AND ACETAMINOPHEN 5; 325 MG/1; MG/1
2 TABLET ORAL EVERY 6 HOURS
Refills: 0 | Status: DISCONTINUED | OUTPATIENT
Start: 2022-02-13 | End: 2022-02-16

## 2022-02-13 RX ORDER — ENOXAPARIN SODIUM 100 MG/ML
40 INJECTION SUBCUTANEOUS DAILY
Refills: 0 | Status: CANCELLED | OUTPATIENT
Start: 2022-02-14 | End: 2022-02-13

## 2022-02-13 RX ORDER — ACETAMINOPHEN 500 MG
1000 TABLET ORAL ONCE
Refills: 0 | Status: COMPLETED | OUTPATIENT
Start: 2022-02-13 | End: 2022-02-13

## 2022-02-13 RX ADMIN — SODIUM CHLORIDE 75 MILLILITER(S): 9 INJECTION INTRAMUSCULAR; INTRAVENOUS; SUBCUTANEOUS at 15:09

## 2022-02-13 RX ADMIN — Medication 1000 MILLIGRAM(S): at 11:03

## 2022-02-13 RX ADMIN — Medication 30 MILLIGRAM(S): at 10:44

## 2022-02-13 RX ADMIN — Medication 1000 MILLIGRAM(S): at 10:59

## 2022-02-13 RX ADMIN — SODIUM CHLORIDE 1000 MILLILITER(S): 9 INJECTION INTRAMUSCULAR; INTRAVENOUS; SUBCUTANEOUS at 12:04

## 2022-02-13 RX ADMIN — SODIUM CHLORIDE 1700 MILLILITER(S): 9 INJECTION INTRAMUSCULAR; INTRAVENOUS; SUBCUTANEOUS at 10:02

## 2022-02-13 RX ADMIN — CEFTRIAXONE 100 MILLIGRAM(S): 500 INJECTION, POWDER, FOR SOLUTION INTRAMUSCULAR; INTRAVENOUS at 09:33

## 2022-02-13 RX ADMIN — LEVETIRACETAM 1000 MILLIGRAM(S): 250 TABLET, FILM COATED ORAL at 17:05

## 2022-02-13 RX ADMIN — CEFTRIAXONE 1000 MILLIGRAM(S): 500 INJECTION, POWDER, FOR SOLUTION INTRAMUSCULAR; INTRAVENOUS at 09:55

## 2022-02-13 RX ADMIN — Medication 30 MILLIGRAM(S): at 11:03

## 2022-02-13 RX ADMIN — SODIUM CHLORIDE 1000 MILLILITER(S): 9 INJECTION INTRAMUSCULAR; INTRAVENOUS; SUBCUTANEOUS at 12:05

## 2022-02-13 RX ADMIN — Medication 400 MILLIGRAM(S): at 15:38

## 2022-02-13 RX ADMIN — SODIUM CHLORIDE 1700 MILLILITER(S): 9 INJECTION INTRAMUSCULAR; INTRAVENOUS; SUBCUTANEOUS at 09:02

## 2022-02-13 RX ADMIN — Medication 400 MILLIGRAM(S): at 10:44

## 2022-02-13 RX ADMIN — Medication 200 MILLIGRAM(S): at 21:58

## 2022-02-13 RX ADMIN — Medication 600 MILLIGRAM(S): at 18:30

## 2022-02-13 NOTE — ED PROVIDER NOTE - PROGRESS NOTE DETAILS
copies of current results provided to pt including prelim xray chest await advanced imaging pt and  aware of the results and current plan inc abx pt returned from ct and was slurring her speech but awake alert oriented able to converse well  rectal temp 105 accucheck 160 no seizure activity will give antipyretics and await ct  have ativan ready incase of seizure from the fever. fever coming down pt feeling better speech returned to baseline urology paged regarding obstructing renal stone and septic appearance of pt urology paged regarding obstructing renal stone and septic appearance of pt  dw dr dunbar will take to or  dw hospitalist accepts to service  results provied to pt

## 2022-02-13 NOTE — ED PROVIDER NOTE - OBJECTIVE STATEMENT
pt is a 47 yo f who has hx of seizure do, sp tubal ligation not a smoker or drinker no drugs no allergies  presents today with left flank pain urgency frequency and fever to tmax of 100.6 better with tylenol.  no hx of same no vag bleed no discharge no uri sx no cp no sob covid vaccinated    bib  for eval

## 2022-02-13 NOTE — H&P ADULT - HISTORY OF PRESENT ILLNESS
45y/o F. with hx of seizure disorder presenting with left flank pain x 2 days with coinciding fever 100.6.  Pt. reported that she was having chills and increased urinary frequency.  In ED WBC 16.3 with fever 105.1.  She was found to be + on COVID PCR but denies having any loss of smell or taste.  Denies any cough or SOB.  CXR was unremarkable.  CT renal stone hunt with mild left hydronephrosis with a 6 mm calculus in the proximal left ureter and mild hydroureter distal to the calculus with a 3 mm calculus in the urinary bladder in the region of the left ureterovesical junction.  Mildly enlarged right ovary measuring 4.2 x 3.9 cm; a pelvic ultrasound is recommended for further evaluation.  Pt. denies having cough, SOB, CP, N/V, abdominal pain, dysuria, or diarrhea.  Rest of ROS negative.     45y/o F. with hx of seizure disorder presenting with left flank pain x 2 days with coinciding fever 100.6.  Pt. reported that she was having chills and increased urinary frequency.  In ED WBC 16.3 with fever 105.1.  She was found to be + on COVID PCR but denies having any loss of smell or taste.  Denies any cough or SOB.  CXR was unremarkable.  CT renal stone hunt with mild left hydronephrosis with a 6 mm calculus in the proximal left ureter and mild hydroureter distal to the calculus with a 3 mm calculus in the urinary bladder in the region of the left ureterovesical junction.  Mildly enlarged right ovary measuring 4.2 x 3.9 cm; a pelvic ultrasound is recommended for further evaluation.  Received Ceftriaxone and 2.7L NS bolus.  Pt. denies having cough, SOB, CP, N/V, abdominal pain, dysuria, or diarrhea.  Rest of ROS negative.

## 2022-02-13 NOTE — H&P ADULT - ASSESSMENT
47y/o F. with hx of seizure disorder presenting with left flank pain and fever.  Found to have 6mm calculus with mild left hydronephrosis.  Incidentally positive for COVID 19 with no cough or SOB.      #Sepsis 2/2 obstructive kidney stone/pyelonephritis:  Continue Ceftriaxone 1gm IV daily and NS@75cc/hr x 12 hours.  Percocet PRN.  Check urine cx and blood cx.  Urology and ID consult.      #COVID 19 infection:  Pt. asymptomatic.  Denies cough or SOB.  No need to initiate decadron as patient is on room air with SpO2 in 90th%.  ID consult     #Mild enlarged right ovary:  Pt. aware of CT findings.  Will follow up with outpatient US.      #Seizure d/o:  continue Carbamazepine and Keppra.     #VTE ppx:  Lovenox 40mg SQ daily    IMPROVE VTE Individual Risk Assessment          RISK                                                          Points  [  ] Previous VTE                                                3  [  ] Thrombophilia                                             2  [  ] Lower limb paralysis                                   2        (unable to hold up >15 seconds)    [  ] Current Cancer                                             2         (within 6 months)  [  ] Immobilization > 24 hrs                              1  [  ] ICU/CCU stay > 24 hours                             1  [  ] Age > 60                                                         1    IMPROVE VTE Score: 0     45y/o F. with hx of seizure disorder presenting with left flank pain and fever.  Found to have 6mm calculus with mild left hydronephrosis.  Incidentally positive for COVID 19 with no cough or SOB.      #Sepsis 2/2 obstructive kidney stone/pyelonephritis:  Continue Ceftriaxone 1gm IV daily and NS@75cc/hr x 12 hours.  Percocet PRN.  Check urine cx and blood cx.  Urology and ID consult.  Pt. is w/o s/s of ACS, decompensated CHF, or unstable arrhythmia.  There is no absolute contraindication from medical perspective to proceed with emergent ureteral stent placement if needed.  Check EKG.      #COVID 19 infection:  Pt. asymptomatic.  Denies cough or SOB.  No need to initiate decadron as patient is on room air with SpO2 in 90th%.  ID consult     #Mild enlarged right ovary:  Pt. aware of CT findings.  Will follow up with outpatient US.      #Seizure d/o:  continue Carbamazepine and Keppra.     #VTE ppx:  Lovenox 40mg SQ daily    IMPROVE VTE Individual Risk Assessment          RISK                                                          Points  [  ] Previous VTE                                                3  [  ] Thrombophilia                                             2  [  ] Lower limb paralysis                                   2        (unable to hold up >15 seconds)    [  ] Current Cancer                                             2         (within 6 months)  [  ] Immobilization > 24 hrs                              1  [  ] ICU/CCU stay > 24 hours                             1  [  ] Age > 60                                                         1    IMPROVE VTE Score: 0

## 2022-02-13 NOTE — CONSULT NOTE ADULT - SUBJECTIVE AND OBJECTIVE BOX
[facility]  INFECTIOUS DISEASES - [address], [place] [address]  Tel: [phone]     Fax: [phone]  =======================================================    [id number]  EMMETT ROBSON     CC: Patient is a 46y old  Female who presents with a chief complaint of obstructive uropathy/pyelonephritis (2022 12:57)    HPI:  45y/o F. with hx of seizure disorder, who presented with left flank pain. Symptoms started last Friday but has gotten worse since. Associated with chills and increased urinary frequency.  Denies any cough, SOB, headache, nausea, vomiting or diarrhea. Has been COVID vaccinated with Moderna x 3. Denies any sick contacts or recent travel. Took a short course of antibiotics several weeks ago after a root canal.      PAST MEDICAL & SURGICAL HISTORY:  Epilepsy    No significant past surgical history        Social Hx:     FAMILY HISTORY:  No pertinent family history in first degree relatives        Allergies    No Known Allergies    Intolerances        Antibiotics:  MEDICATIONS  (STANDING):  carBAMazepine 500 milliGRAM(s) Oral <User Schedule>  carBAMazepine 400 milliGRAM(s) Oral <User Schedule>  carBAMazepine 600 milliGRAM(s) Oral <User Schedule>  cefTRIAXone   IVPB 1000 milliGRAM(s) IV Intermittent every 24 hours  levETIRAcetam 1000 milliGRAM(s) Oral two times a day  sodium chloride 0.9%. 1000 milliLiter(s) (75 mL/Hr) IV Continuous <Continuous>    MEDICATIONS  (PRN):  acetaminophen     Tablet .. 650 milliGRAM(s) Oral every 6 hours PRN Temp greater or equal to 38C (100.4F), Mild Pain (1 - 3)  aluminum hydroxide/magnesium hydroxide/simethicone Suspension 30 milliLiter(s) Oral every 4 hours PRN Dyspepsia  melatonin 3 milliGRAM(s) Oral at bedtime PRN Insomnia  ondansetron Injectable 4 milliGRAM(s) IV Push every 8 hours PRN Nausea and/or Vomiting  oxycodone    5 mG/acetaminophen 325 mG 1 Tablet(s) Oral every 6 hours PRN Moderate Pain (4 - 6)  oxycodone    5 mG/acetaminophen 325 mG 2 Tablet(s) Oral every 6 hours PRN Severe Pain (7 - 10)       REVIEW OF SYSTEMS:  CONSTITUTIONAL:  (+) fever and chills, (+) fatirgue  HEENT:  No loss of smell/taste.  No sore throat or runny nose.  CARDIOVASCULAR:  No chest pain or SOB.  RESPIRATORY:  No cough, shortness of breath,   GASTROINTESTINAL:  No nausea, vomiting or diarrhea.  GENITOURINARY:  see history  MUSCULOSKELETAL:  no joint aches  NEUROLOGIC:  No headache  PSYCHIATRIC:  No disorder of thought or mood.      Physical Exam:  Vital Signs Last 24 Hrs  T(C): 37.2 (2022 13:29), Max: 40.6 (2022 11:00)  T(F): 99 (2022 13:29), Max: 105.1 (2022 11:00)  HR: 110 (:) (97 - 128)  BP: 121/59 (2022 13:29) (121/59 - 124/78)  BP(mean): --  RR: 17 (2022 13:29) (16 - 20)  SpO2: 99% (2022 13:29) (97% - 100%)  Height (cm): 162.6 ( @ 08:19)  Weight (kg): 56.7 ( @ 13:18)  BMI (kg/m2): 21.4 ( @ 13:18)  BSA (m2): 1.6 ( @ :18)  GEN: NAD  HEENT: normocephalic and atraumatic.   NECK: Supple.   LUNGS: normal respiratory effort  HEART: tachycardic  ABDOMEN: Soft, nontender, and nondistended.   EXTREMITIES: No leg edema.  NEUROLOGIC: grossly intact.  PSYCHIATRIC: Appropriate affect .    Labs:      134<L>  |  101  |  14  ----------------------------<  114<H>  4.2   |  27  |  1.10    Ca    8.6      2022 08:55    TPro  8.1  /  Alb  3.7  /  TBili  0.6  /  DBili  x   /  AST  23  /  ALT  16  /  AlkPhos  73                            13.2   16.31 )-----------( 245      ( 2022 08:55 )             38.3     PT/INR - ( 2022 12:05 )   PT: 13.5 sec;   INR: 1.16 ratio         PTT - ( 2022 12:05 )  PTT:24.5 sec  Urinalysis Basic - ( 2022 08:53 )    Color: Yellow / Appearance: Slightly Turbid / S.020 / pH: x  Gluc: x / Ketone: Moderate  / Bili: Negative / Urobili: Negative   Blood: x / Protein: 30 mg/dL / Nitrite: Negative   Leuk Esterase: Moderate / RBC: 3-5 /HPF / WBC >50   Sq Epi: x / Non Sq Epi: Moderate / Bacteria: Moderate      LIVER FUNCTIONS - ( 2022 08:55 )  Alb: 3.7 g/dL / Pro: 8.1 g/dL / ALK PHOS: 73 U/L / ALT: 16 U/L / AST: 23 U/L / GGT: x                       COVID-19 PCR: Detected (22 @ 08:57)      RECENT CULTURES:        All imaging and other data have been reviewed.    CT renal stone:  LOWER CHEST: Lung bases are clear.    LIVER: Within normal limits.  BILE DUCTS: Normal caliber.  GALLBLADDER: Within normal limits.  SPLEEN: Within normal limits.  PANCREAS: Within normal limits.  ADRENALS: Within normal limits.  KIDNEYS/URETERS: Mild left hydronephrosis with a 6 mm calculus in the   proximal left ureter. Mild hydroureter distally calculus with a 3 mm   calculus in the urinary bladder in the region of the left ureterovesical   junction (series 2 image 115). No right hydronephrosis. No intrarenal   calculi bilaterally. Mild left perinephric fat stranding.    BLADDER: See above.  REPRODUCTIVE ORGANS: Unremarkable uterus. Mildly enlarged right ovary   compared to the left measuring 4.8 x 3.2 cm (series 602 image 73).    BOWEL: Moderate to large amount of retained fecal material in the right   hemicolon and transverse colon, greater in the right hemicolon. No   evidence of a bowel obstruction. No evidence of appendicitis.  PERITONEUM: No ascites.  VESSELS: Abdominal aorta normal in caliber.  RETROPERITONEUM/LYMPH NODES: No lymphadenopathy.  ABDOMINAL WALL: Unremarkable.  BONES: No aggressive osseous lesion.    IMPRESSION:  Mild left hydronephrosis with a 6 mm calculus in the proximal left ureter   and mild hydroureter distal to the calculus with a 3 mm calculus in the   urinary bladder in the region of the left ureterovesical junction.    Mildly enlarged right ovary measuring 4.2 x 3.9 cm; a pelvic ultrasound   is recommended for further evaluation.      CXR:    FINDINGS: Normal cardiac silhouette and normal pulmonary vasculature with   no consolidation, effusion, gross adenopathy, pneumothorax or acute   osseous finding.    IMPRESSION:  Negative study

## 2022-02-13 NOTE — CONSULT NOTE ADULT - SUBJECTIVE AND OBJECTIVE BOX
CHIEF COMPLAINT: Left renal colic and fever.    HISTORY OF PRESENT ILLNESS:    46 year old female with left ureteral stone and fever. This AM patient developed acute onset of left flank pain and low grade fever with increased urinary frequency and urgency for which she presented to the ED. Upon presentation, she was noted to have a temperature of 105. CT stone study demonstrated a 6 mm left proximal ureteral stone and a 3 mm left UVJ calculus. Her admission labs are notable for leukocytosis-wbc 16.31 with left shift. She received ceftriaxone and arrangements were made for emergent left ureteral stent insertion.    PAST MEDICAL & SURGICAL HISTORY:  Epilepsy    No significant past surgical history        REVIEW OF SYSTEMS:    CONSTITUTIONAL: Fever as above  EYES/ENT: No visual changes;  No vertigo or throat pain   NECK: No pain or stiffness  RESPIRATORY: No cough, wheezing, hemoptysis; No shortness of breath  CARDIOVASCULAR: No chest pain or palpitations  GASTROINTESTINAL: As above.  GENITOURINARY: as above  NEUROLOGICAL: No numbness or weakness  SKIN: No itching, burning, rashes, or lesions   All other review of systems is negative unless indicated above.    MEDICATIONS  (STANDING):    MEDICATIONS  (PRN):      Allergies    No Known Allergies    Intolerances        SOCIAL HISTORY:    FAMILY HISTORY:      Vital Signs Last 24 Hrs  T(C): 40.6 (2022 12:42), Max: 40.6 (2022 11:00)  T(F): 105.1 (2022 12:42), Max: 105.1 (2022 11:00)  HR: 128 (2022 12:42) (97 - 128)  BP: 121/59 (2022 12:42) (121/59 - 124/78)  BP(mean): --  RR: 20 (2022 12:42) (16 - 20)  SpO2: 97% (2022 12:42) (97% - 100%)    PHYSICAL EXAM:    Constitutional: NAD, well-developed  Back: Normal spine flexure, Mild left CVA tenderness  Abd: Soft, NT/ND, Mild left CVAT  Extremities: No peripheral edema  Neurological: A/O x 3  Psychiatric: Normal mood, normal affect  Skin: No rashes    LABS:                        13.2   16.31 )-----------( 245      ( 2022 08:55 )             38.3     02-13    134<L>  |  101  |  14  ----------------------------<  114<H>  4.2   |  27  |  1.10    Ca    8.6      2022 08:55    TPro  8.1  /  Alb  3.7  /  TBili  0.6  /  DBili  x   /  AST  23  /  ALT  16  /  AlkPhos  73        Urinalysis Basic - ( 2022 08:53 )    Color: Yellow / Appearance: Slightly Turbid / S.020 / pH: x  Gluc: x / Ketone: Moderate  / Bili: Negative / Urobili: Negative   Blood: x / Protein: 30 mg/dL / Nitrite: Negative   Leuk Esterase: Moderate / RBC: 3-5 /HPF / WBC >50   Sq Epi: x / Non Sq Epi: Moderate / Bacteria: Moderate    RADIOLOGY & ADDITIONAL STUDIES:    < from: CT Renal Stone Hunt (22 @ 10:32) >    ACC: [id number] EXAM:  CT RENAL STONE HUNT                          PROCEDURE DATE:  2022          INTERPRETATION:  CLINICAL INFORMATION: Left flank pain. Evaluate for   kidney stone versus pyelonephritis.    COMPARISON: None.    CONTRAST/COMPLICATIONS:  IV Contrast: NONE  Oral Contrast: NONE  Complications: None reported at time of study completion    PROCEDURE:  CT of the Abdomen and Pelvis was performed.  Sagittal and coronal reformats were performed.  The examination is mildly degraded by motion artifact.    FINDINGS:  LOWER CHEST: Lung bases are clear.    LIVER: Within normal limits.  BILE DUCTS: Normal caliber.  GALLBLADDER: Within normal limits.  SPLEEN: Within normal limits.  PANCREAS: Within normal limits.  ADRENALS: Within normal limits.  KIDNEYS/URETERS: Mild left hydronephrosis with a 6 mm calculus in the   proximal left ureter. Mild hydroureter distally calculus with a 3 mm   calculus in the urinary bladder in the region of the left ureterovesical   junction (series 2 image 115). No right hydronephrosis. No intrarenal   calculi bilaterally. Mild left perinephric fat stranding.    BLADDER: See above.  REPRODUCTIVE ORGANS: Unremarkable uterus. Mildly enlarged right ovary   compared to the left measuring 4.8 x 3.2 cm (series 602 image 73).    BOWEL: Moderate to large amount of retained fecal material in the right   hemicolon and transverse colon, greater in the right hemicolon. No   evidence of a bowel obstruction. No evidence of appendicitis.  PERITONEUM: No ascites.  VESSELS: Abdominal aorta normal in caliber.  RETROPERITONEUM/LYMPH NODES: No lymphadenopathy.  ABDOMINAL WALL: Unremarkable.  BONES: No aggressive osseous lesion.    IMPRESSION:  Mild left hydronephrosis with a 6 mm calculus in the proximal left ureter   and mild hydroureter distal to the calculus with a 3 mm calculus in the   urinary bladder in the region of the left ureterovesical junction.    Mildly enlarged right ovary measuring 4.2 x 3.9 cm; a pelvic ultrasound   is recommended for further evaluation.    --- End of Report ---            NATALIE SCHWARZT MD; Attending Radiologist  This document has been electronically signed. 2022 11:23AM    < end of copied text >   CHIEF COMPLAINT: Left renal colic and fever.    HISTORY OF PRESENT ILLNESS:    46 year old female with left ureteral stone and fever. This AM patient developed acute onset of left flank pain and low grade fever with increased urinary frequency and urgency for which she presented to the ED. Upon presentation, she was noted to have a temperature of 105.6. CT stone study demonstrated a 6 mm left proximal ureteral stone and a 3 mm left UVJ calculus. Her admission labs are notable for leukocytosis-wbc 16.31 with left shift. She received ceftriaxone and arrangements were made for emergent left ureteral stent insertion.    PAST MEDICAL & SURGICAL HISTORY:  Epilepsy    No significant past surgical history        REVIEW OF SYSTEMS:    CONSTITUTIONAL: Fever as above  EYES/ENT: No visual changes;  No vertigo or throat pain   NECK: No pain or stiffness  RESPIRATORY: No cough, wheezing, hemoptysis; No shortness of breath  CARDIOVASCULAR: No chest pain or palpitations  GASTROINTESTINAL: As above.  GENITOURINARY: as above  NEUROLOGICAL: No numbness or weakness  SKIN: No itching, burning, rashes, or lesions   All other review of systems is negative unless indicated above.    MEDICATIONS  (STANDING):    Keppra    MEDICATIONS  (PRN):      Allergies    No Known Allergies    Intolerances        SOCIAL HISTORY:    FAMILY HISTORY:      Vital Signs Last 24 Hrs  T(C): 40.6 (2022 12:42), Max: 40.6 (2022 11:00)  T(F): 105.1 (2022 12:42), Max: 105.1 (2022 11:00)  HR: 128 (2022 12:42) (97 - 128)  BP: 121/59 (2022 12:42) (121/59 - 124/78)  BP(mean): --  RR: 20 (2022 12:42) (16 - 20)  SpO2: 97% (2022 12:42) (97% - 100%)    PHYSICAL EXAM:    Constitutional: NAD, well-developed  Back: Normal spine flexure, Mild left CVA tenderness  Abd: Soft, NT/ND, Mild left CVAT  Extremities: No peripheral edema  Neurological: A/O x 3  Psychiatric: Normal mood, normal affect  Skin: No rashes    LABS:                        13.2   16.31 )-----------( 245      ( 2022 08:55 )             38.3         134<L>  |  101  |  14  ----------------------------<  114<H>  4.2   |  27  |  1.10    Ca    8.6      2022 08:55    TPro  8.1  /  Alb  3.7  /  TBili  0.6  /  DBili  x   /  AST  23  /  ALT  16  /  AlkPhos  73        Urinalysis Basic - ( 2022 08:53 )    Color: Yellow / Appearance: Slightly Turbid / S.020 / pH: x  Gluc: x / Ketone: Moderate  / Bili: Negative / Urobili: Negative   Blood: x / Protein: 30 mg/dL / Nitrite: Negative   Leuk Esterase: Moderate / RBC: 3-5 /HPF / WBC >50   Sq Epi: x / Non Sq Epi: Moderate / Bacteria: Moderate    RADIOLOGY & ADDITIONAL STUDIES:    < from: CT Renal Stone Hunt (22 @ 10:32) >    ACC: [id number] EXAM:  CT RENAL STONE HUNT                          PROCEDURE DATE:  2022          INTERPRETATION:  CLINICAL INFORMATION: Left flank pain. Evaluate for   kidney stone versus pyelonephritis.    COMPARISON: None.    CONTRAST/COMPLICATIONS:  IV Contrast: NONE  Oral Contrast: NONE  Complications: None reported at time of study completion    PROCEDURE:  CT of the Abdomen and Pelvis was performed.  Sagittal and coronal reformats were performed.  The examination is mildly degraded by motion artifact.    FINDINGS:  LOWER CHEST: Lung bases are clear.    LIVER: Within normal limits.  BILE DUCTS: Normal caliber.  GALLBLADDER: Within normal limits.  SPLEEN: Within normal limits.  PANCREAS: Within normal limits.  ADRENALS: Within normal limits.  KIDNEYS/URETERS: Mild left hydronephrosis with a 6 mm calculus in the   proximal left ureter. Mild hydroureter distally calculus with a 3 mm   calculus in the urinary bladder in the region of the left ureterovesical   junction (series 2 image 115). No right hydronephrosis. No intrarenal   calculi bilaterally. Mild left perinephric fat stranding.    BLADDER: See above.  REPRODUCTIVE ORGANS: Unremarkable uterus. Mildly enlarged right ovary   compared to the left measuring 4.8 x 3.2 cm (series 602 image 73).    BOWEL: Moderate to large amount of retained fecal material in the right   hemicolon and transverse colon, greater in the right hemicolon. No   evidence of a bowel obstruction. No evidence of appendicitis.  PERITONEUM: No ascites.  VESSELS: Abdominal aorta normal in caliber.  RETROPERITONEUM/LYMPH NODES: No lymphadenopathy.  ABDOMINAL WALL: Unremarkable.  BONES: No aggressive osseous lesion.    IMPRESSION:  Mild left hydronephrosis with a 6 mm calculus in the proximal left ureter   and mild hydroureter distal to the calculus with a 3 mm calculus in the   urinary bladder in the region of the left ureterovesical junction.    Mildly enlarged right ovary measuring 4.2 x 3.9 cm; a pelvic ultrasound   is recommended for further evaluation.    --- End of Report ---            NATALIE SCHWARTZ MD; Attending Radiologist  This document has been electronically signed. 2022 11:23AM    < end of copied text >

## 2022-02-13 NOTE — ED ADULT NURSE NOTE - SEPSIS REFERENCE DATA FOR CRITERIA 1 WBC
Patient informed that 1 month Rx with 1 refill will be sent to pharmacy to cover her until her appointment.  Patient verbalized understanding.  Script for #30 w/ 1 refill was eprescribed to pharmacy per Dr. Nice.     Abnormal WBC: < 4,000 OR > 12,000

## 2022-02-13 NOTE — ED PROVIDER NOTE - CLINICAL SUMMARY MEDICAL DECISION MAKING FREE TEXT BOX
presents today with left flank pain urgency frequency and fever to tmax of 100.6 better with tylenol.  no hx of same no vag bleed no discharge no uri sx no cp no sob covid vaccinated  ro pyelo ro renal colic ro pn ro pe  xray ekg cultures covid testing ct scan ro stone  abx for the elevated wbc count  ua   iv fluids and re-evaluation

## 2022-02-13 NOTE — ED ADULT NURSE REASSESSMENT NOTE - NS ED NURSE REASSESS COMMENT FT1
Pt returned from CT scan appearing to be AMS, garbled speech, having rigors.  Upon assessment, pt noted to have fever 105.1 rectal, cooling blanket and ice packs applied.  Seizure pads added to side rails for protection.  Pt now resting comfortably in bed.  Temperature decreased 100.6.  Will continue to monitor.

## 2022-02-13 NOTE — PATIENT PROFILE ADULT - VISION (WITH CORRECTIVE LENSES IF THE PATIENT USUALLY WEARS THEM):
Partially impaired: cannot see medication labels or newsprint, but can see obstacles in path, and the surrounding layout; can count fingers at arm's length Pounds Preamble Statement (Weight Entered In Details Tab): Reported Weight in pounds:

## 2022-02-13 NOTE — CONSULT NOTE ADULT - PROBLEM SELECTOR RECOMMENDATION 2
Ceftriaxone administered. For emergent left ureteral stent insertion. Risks and benefits discussed. All questions answered.

## 2022-02-13 NOTE — ED PROVIDER NOTE - CARE PLAN
1 Principal Discharge DX:	Sepsis, unspecified organism  Secondary Diagnosis:	2019 novel coronavirus disease (COVID-19)  Secondary Diagnosis:	Left renal stone

## 2022-02-14 ENCOUNTER — TRANSCRIPTION ENCOUNTER (OUTPATIENT)
Age: 47
End: 2022-02-14

## 2022-02-14 LAB
ALBUMIN SERPL ELPH-MCNC: 2.7 G/DL — LOW (ref 3.3–5)
ALP SERPL-CCNC: 69 U/L — SIGNIFICANT CHANGE UP (ref 40–120)
ALT FLD-CCNC: 21 U/L — SIGNIFICANT CHANGE UP (ref 12–78)
ANION GAP SERPL CALC-SCNC: 5 MMOL/L — SIGNIFICANT CHANGE UP (ref 5–17)
AST SERPL-CCNC: 35 U/L — SIGNIFICANT CHANGE UP (ref 15–37)
BASOPHILS # BLD AUTO: 0 K/UL — SIGNIFICANT CHANGE UP (ref 0–0.2)
BASOPHILS NFR BLD AUTO: 0 % — SIGNIFICANT CHANGE UP (ref 0–2)
BILIRUB SERPL-MCNC: 0.2 MG/DL — SIGNIFICANT CHANGE UP (ref 0.2–1.2)
BUN SERPL-MCNC: 16 MG/DL — SIGNIFICANT CHANGE UP (ref 7–23)
CALCIUM SERPL-MCNC: 7.5 MG/DL — LOW (ref 8.5–10.1)
CHLORIDE SERPL-SCNC: 107 MMOL/L — SIGNIFICANT CHANGE UP (ref 96–108)
CO2 SERPL-SCNC: 26 MMOL/L — SIGNIFICANT CHANGE UP (ref 22–31)
CREAT SERPL-MCNC: 0.8 MG/DL — SIGNIFICANT CHANGE UP (ref 0.5–1.3)
EOSINOPHIL # BLD AUTO: 0 K/UL — SIGNIFICANT CHANGE UP (ref 0–0.5)
EOSINOPHIL NFR BLD AUTO: 0 % — SIGNIFICANT CHANGE UP (ref 0–6)
GLUCOSE SERPL-MCNC: 87 MG/DL — SIGNIFICANT CHANGE UP (ref 70–99)
HCT VFR BLD CALC: 29.8 % — LOW (ref 34.5–45)
HCT VFR BLD CALC: 32.1 % — LOW (ref 34.5–45)
HGB BLD-MCNC: 10.1 G/DL — LOW (ref 11.5–15.5)
HGB BLD-MCNC: 10.8 G/DL — LOW (ref 11.5–15.5)
LYMPHOCYTES # BLD AUTO: 1.15 K/UL — SIGNIFICANT CHANGE UP (ref 1–3.3)
LYMPHOCYTES # BLD AUTO: 5 % — LOW (ref 13–44)
MAGNESIUM SERPL-MCNC: 1.9 MG/DL — SIGNIFICANT CHANGE UP (ref 1.6–2.6)
MCHC RBC-ENTMCNC: 30.7 PG — SIGNIFICANT CHANGE UP (ref 27–34)
MCHC RBC-ENTMCNC: 33.9 GM/DL — SIGNIFICANT CHANGE UP (ref 32–36)
MCV RBC AUTO: 90.6 FL — SIGNIFICANT CHANGE UP (ref 80–100)
MONOCYTES # BLD AUTO: 1.15 K/UL — HIGH (ref 0–0.9)
MONOCYTES NFR BLD AUTO: 5 % — SIGNIFICANT CHANGE UP (ref 2–14)
NEUTROPHILS # BLD AUTO: 20.28 K/UL — HIGH (ref 1.8–7.4)
NEUTROPHILS NFR BLD AUTO: 86 % — HIGH (ref 43–77)
NRBC # BLD: SIGNIFICANT CHANGE UP /100 WBCS (ref 0–0)
PHOSPHATE SERPL-MCNC: 3 MG/DL — SIGNIFICANT CHANGE UP (ref 2.5–4.5)
PLATELET # BLD AUTO: 186 K/UL — SIGNIFICANT CHANGE UP (ref 150–400)
POTASSIUM SERPL-MCNC: 3.7 MMOL/L — SIGNIFICANT CHANGE UP (ref 3.5–5.3)
POTASSIUM SERPL-SCNC: 3.7 MMOL/L — SIGNIFICANT CHANGE UP (ref 3.5–5.3)
PROT SERPL-MCNC: 6 G/DL — SIGNIFICANT CHANGE UP (ref 6–8.3)
RBC # BLD: 3.29 M/UL — LOW (ref 3.8–5.2)
RBC # FLD: 13.2 % — SIGNIFICANT CHANGE UP (ref 10.3–14.5)
SODIUM SERPL-SCNC: 138 MMOL/L — SIGNIFICANT CHANGE UP (ref 135–145)
WBC # BLD: 23.05 K/UL — HIGH (ref 3.8–10.5)
WBC # FLD AUTO: 23.05 K/UL — HIGH (ref 3.8–10.5)

## 2022-02-14 PROCEDURE — 99232 SBSQ HOSP IP/OBS MODERATE 35: CPT

## 2022-02-14 PROCEDURE — 99233 SBSQ HOSP IP/OBS HIGH 50: CPT

## 2022-02-14 RX ORDER — SACCHAROMYCES BOULARDII 250 MG
250 POWDER IN PACKET (EA) ORAL
Refills: 0 | Status: DISCONTINUED | OUTPATIENT
Start: 2022-02-14 | End: 2022-02-16

## 2022-02-14 RX ADMIN — ENOXAPARIN SODIUM 40 MILLIGRAM(S): 100 INJECTION SUBCUTANEOUS at 12:00

## 2022-02-14 RX ADMIN — Medication 500 MILLIGRAM(S): at 05:11

## 2022-02-14 RX ADMIN — Medication 200 MILLIGRAM(S): at 21:45

## 2022-02-14 RX ADMIN — LEVETIRACETAM 1000 MILLIGRAM(S): 250 TABLET, FILM COATED ORAL at 14:44

## 2022-02-14 RX ADMIN — CEFTRIAXONE 100 MILLIGRAM(S): 500 INJECTION, POWDER, FOR SOLUTION INTRAMUSCULAR; INTRAVENOUS at 09:40

## 2022-02-14 RX ADMIN — LEVETIRACETAM 1000 MILLIGRAM(S): 250 TABLET, FILM COATED ORAL at 05:11

## 2022-02-14 RX ADMIN — Medication 400 MILLIGRAM(S): at 14:44

## 2022-02-14 RX ADMIN — Medication 200 MILLIGRAM(S): at 05:10

## 2022-02-14 RX ADMIN — Medication 600 MILLIGRAM(S): at 18:39

## 2022-02-14 RX ADMIN — Medication 200 MILLIGRAM(S): at 14:44

## 2022-02-14 RX ADMIN — Medication 250 MILLIGRAM(S): at 18:38

## 2022-02-14 NOTE — DISCHARGE NOTE PROVIDER - CARE PROVIDER_API CALL
Yaquelin See)  Infectious Disease; Internal Medicine  [address] [place] [address]  Phone: [phone]  Fax: [phone]  Follow Up Time: 1 week    Damir [facility] Phhysician  Phone: (   )    -  Fax: (   )    -  Follow Up Time: 1 week   Yaquelin See)  Infectious Disease; Internal Medicine  [address] [place] [address]  Phone: [phone]  Fax: [phone]  Follow Up Time: 1 week    Damir Woodall [facility] Phhysician  Phone: (   )    -  Fax: (   )    -  Follow Up Time: 1 week    Tommy Alegre)  Urology  [address] [facility], [address] [place] [address]  Phone: [phone]  Fax: [phone]  Follow Up Time: 1 week

## 2022-02-14 NOTE — ANESTHESIA FOLLOW-UP NOTE - NSEVALATIONFT_GEN_ALL_CORE
Patient not examined in person due to high risk of COVID transmission. Patient tested positive for COVID-19 Patient chart evaluated in detail. No apparent anesthesia-related complications. Please contact anesthesiology department if suspect anesthesia-related complication.

## 2022-02-14 NOTE — DISCHARGE NOTE PROVIDER - PROVIDER TOKENS
PROVIDER:[TOKEN:[[id number]],FOLLOWUP:[1 week]],FREE:[LAST:[Pateen],FIRST:[Damir],PHONE:[(   )    -],FAX:[(   )    -],ADDRESS:[Your Primary Care Phhysician],FOLLOWUP:[1 week]] PROVIDER:[TOKEN:[[id number]],FOLLOWUP:[1 week]],FREE:[LAST:[Pateen],FIRST:[Damir],PHONE:[(   )    -],FAX:[(   )    -],[facility]FOLLOWUP:[1 week]],PROVIDER:[TOKEN:[[id number]],FOLLOWUP:[1 week]]

## 2022-02-14 NOTE — DISCHARGE NOTE PROVIDER - NSDCMRMEDTOKEN_GEN_ALL_CORE_FT
carBAMazepine 200 mg oral tablet: 2 tab(s) orally once a day at 2PM  carBAMazepine 200 mg oral tablet: 3 tab(s) orally once a day at 6PM  carBAMazepine 200 mg oral tablet: 2.5 tab(s) orally once a day at 6AM  levETIRAcetam 500 mg oral tablet: 2 tab(s) orally 2 times a day , 6am &amp; 3pm  Vitamin C 500 mg oral tablet: 1 tab(s) orally once a day  Vitamin D2 1.25 mg (50,000 intl units) oral capsule: 1 cap(s) orally once a week on Saturday    acetaminophen 325 mg oral tablet: 2 tab(s) orally every 6 hours, As needed, Temp greater or equal to 38C (100.4F), Mild Pain (1 - 3)  carBAMazepine 200 mg oral tablet: 2 tab(s) orally once a day at 2PM  carBAMazepine 200 mg oral tablet: 3 tab(s) orally once a day at 6PM  carBAMazepine 200 mg oral tablet: 2.5 tab(s) orally once a day at 6AM  levETIRAcetam 500 mg oral tablet: 2 tab(s) orally 2 times a day , 6am &amp; 3pm  Vitamin C 500 mg oral tablet: 1 tab(s) orally once a day  Vitamin D2 1.25 mg (50,000 intl units) oral capsule: 1 cap(s) orally once a week on Saturday    acetaminophen 325 mg oral tablet: 2 tab(s) orally every 6 hours, As needed, Temp greater or equal to 38C (100.4F), Mild Pain (1 - 3)  carBAMazepine 200 mg oral tablet: 2 tab(s) orally once a day at 2PM  carBAMazepine 200 mg oral tablet: 3 tab(s) orally once a day at 6PM  carBAMazepine 200 mg oral tablet: 2.5 tab(s) orally once a day at 6AM  ciprofloxacin 500 mg oral tablet: 1 tab(s) orally every 12 hours  levETIRAcetam 500 mg oral tablet: 2 tab(s) orally 2 times a day , 6am &amp; 3pm  saccharomyces boulardii lyo 250 mg oral capsule: 1 cap(s) orally 2 times a day  Vitamin C 500 mg oral tablet: 1 tab(s) orally once a day  Vitamin D2 1.25 mg (50,000 intl units) oral capsule: 1 cap(s) orally once a week on Saturday

## 2022-02-14 NOTE — DISCHARGE NOTE PROVIDER - NSDCCPCAREPLAN_GEN_ALL_CORE_FT
PRINCIPAL DISCHARGE DIAGNOSIS  Diagnosis: Sepsis, unspecified organism  Assessment and Plan of Treatment: due to UTI and renal stone, treated with ceftriaxone, and change to oral cipro for 14 days at home      SECONDARY DISCHARGE DIAGNOSES  Diagnosis: 2019 novel coronavirus disease (COVID-19)  Assessment and Plan of Treatment: Asymptomatic    Diagnosis: Left renal stone  Assessment and Plan of Treatment: Seen by ID and Uro, left ureteral stent placement, will complete course of antibiotics at home

## 2022-02-14 NOTE — PROGRESS NOTE ADULT - ASSESSMENT
45y/o F. with hx of seizure disorder, who presented with left flank pain associated with chills. Found to have urosepsis in the setting of L ureteral calculus. S/p L ureteral stent placement by Urology yesterday, with purulent urine noted during procedure. WBC increased to 23 today although probably reactive post op. Patient feels better and fever curve improved. Blood cultures currently no growth.    Patient also incidentally COVID positive. Prior fevers mostly likely due to urosepsis, otherwise patient on room air and without respiratory symptoms. CXR without evidence of pneumonia. She has received COVID Moderna vaccine x 3. She does not qualify for remdesivir or steroids.    1. Sepsis 2/2 UTI in the setting of left ureteral calculus  -continue ceftriaxone  -follow blood and urine cultures  -monitor WBC    2. COVID  -continue supportive management  -AC per institution protocol      Yaquelin See MD  Division of Infectious Diseases   Cell [phone] between 8am and 6pm   After 6pm and weekends please call ID service at [phone].

## 2022-02-14 NOTE — DISCHARGE NOTE PROVIDER - HOSPITAL COURSE
45y/o F. with hx of seizure disorder presenting with left flank pain x 2 days with coinciding fever 100.6.  Pt. reported that she was having chills and increased urinary frequency.  In ED WBC 16.3 with fever 105.1.  She was found to be + on COVID PCR but denies having any loss of smell or taste.  Denies any cough or SOB.  CXR was unremarkable.  CT renal stone hunt with mild left hydronephrosis with a 6 mm calculus in the proximal left ureter and mild hydroureter distal to the calculus with a 3 mm calculus in the urinary bladder in the region of the left ureterovesical junction.  Mildly enlarged right ovary measuring 4.2 x 3.9 cm; a pelvic ultrasound is recommended for further evaluation.  Received Ceftriaxone and 2.7L NS bolus.  Pt. denies having cough, SOB, CP, N/V, abdominal pain, dysuria, or diarrhea.  Rest of ROS negative.     Pt. was admitted with Urology and ID consultation.  She was started on IV antibiotics and given IV hydration.  Pt. was brought to OR by urology and had left ureteral stent placement and tolerated procedure well.  Pt. has remained on room air with SpO2 in 90th% and no antiviral or steroid treatment was warranted for COVID 19 infection. 45y/o F. with hx of seizure disorder presenting with left flank pain x 2 days with coinciding fever 100.6.  Pt. reported that she was having chills and increased urinary frequency.  In ED WBC 16.3 with fever 105.1.  She was found to be + on COVID PCR but denies having any loss of smell or taste.  Denies any cough or SOB.  CXR was unremarkable.  CT renal stone hunt with mild left hydronephrosis with a 6 mm calculus in the proximal left ureter and mild hydroureter distal to the calculus with a 3 mm calculus in the urinary bladder in the region of the left ureterovesical junction.  Mildly enlarged right ovary measuring 4.2 x 3.9 cm; a pelvic ultrasound is recommended for further evaluation.  Received Ceftriaxone and 2.7L NS bolus.  Pt. denies having cough, SOB, CP, N/V, abdominal pain, dysuria, or diarrhea.  Rest of ROS negative.     Pt. was admitted with Urology and ID consultation.  She was started on IV antibiotics and given IV hydration.  Pt. was brought to OR by urology and had left ureteral stent placement and tolerated procedure well.  Pt. has remained on room air with SpO2 in 90th% and no antiviral or steroid treatment was warranted for COVID 19 infection.   Blood cultures showed no growth and OR urine culture with few E. coli. 45y/o F. with hx of seizure disorder presenting with left flank pain x 2 days with coinciding fever 100.6.  Pt. reported that she was having chills and increased urinary frequency.  In ED WBC 16.3 with fever 105.1.  She was found to be + on COVID PCR but denies having any loss of smell or taste.  Denies any cough or SOB.  CXR was unremarkable.  CT renal stone hunt with mild left hydronephrosis with a 6 mm calculus in the proximal left ureter and mild hydroureter distal to the calculus with a 3 mm calculus in the urinary bladder in the region of the left ureterovesical junction.  Mildly enlarged right ovary measuring 4.2 x 3.9 cm; a pelvic ultrasound is recommended for further evaluation.  Received Ceftriaxone and 2.7L NS bolus.  Pt. denies having cough, SOB, CP, N/V, abdominal pain, dysuria, or diarrhea.  Rest of ROS negative.     Pt. was admitted with Urology and ID consultation.  She was started on IV antibiotics and given IV hydration.  Pt. was brought to OR by urology and had left ureteral stent placement and tolerated procedure well.  Pt. has remained on room air with SpO2 in 90th% and no antiviral or steroid treatment was warranted for COVID 19 infection.   Blood cultures showed no growth and OR urine culture with few E. coli.  Patent was evaluated by ID, and was initially treated with ceftriaxone, and then was changed to complete a course of oral ciprofloxacin for 14 days based on OR culture results.  Patient was asymptomatic on day of discharge, and was to follow up with ID, Urology, and PMD.    Vital Signs Last 24 Hrs  T(C): 36.9 (16 Feb 2022 04:59), Max: 37 (15 Feb 2022 12:25)  T(F): 98.4 (16 Feb 2022 04:59), Max: 98.6 (15 Feb 2022 12:25)  HR: 72 (16 Feb 2022 04:59) (72 - 83)  BP: 142/74 (16 Feb 2022 04:59) (128/77 - 142/85)  BP(mean): --  RR: 17 (16 Feb 2022 04:59) (17 - 17)  SpO2: 92% (16 Feb 2022 04:59) (92% - 95%)      PHYSICAL EXAM:     GENERAL: no acute distress  HEENT: NC/AT, EOMI, neck supple, MMM  RESPIRATORY: LCTAB/L, no rhonchi, rales, or wheezing  CARDIOVASCULAR: RRR, no murmurs, gallops, rubs  ABDOMINAL: soft, non-tender, non-distended, positive bowel sounds   EXTREMITIES: no clubbing, cyanosis, or edema  NEUROLOGICAL: alert and oriented x 3, non-focal  SKIN: no rashes or lesions   MUSCULOSKELETAL: no gross joint deformity                          10.8   6.66  )-----------( 184      ( 16 Feb 2022 07:22 )             32.0     02-16    139  |  107  |  9   ----------------------------<  96  3.8   |  27  |  0.58    Ca    7.6<L>      16 Feb 2022 07:22

## 2022-02-14 NOTE — DISCHARGE NOTE PROVIDER - CARE PROVIDERS DIRECT ADDRESSES
,DirectAddress_Unknown,DirectAddress_Unknown ,DirectAddress_Unknown,DirectAddress_Unknown,[email or web address]

## 2022-02-15 LAB
-  AMIKACIN: SIGNIFICANT CHANGE UP
-  AMOXICILLIN/CLAVULANIC ACID: SIGNIFICANT CHANGE UP
-  AMPICILLIN/SULBACTAM: SIGNIFICANT CHANGE UP
-  AMPICILLIN: SIGNIFICANT CHANGE UP
-  AZTREONAM: SIGNIFICANT CHANGE UP
-  CEFAZOLIN: SIGNIFICANT CHANGE UP
-  CEFEPIME: SIGNIFICANT CHANGE UP
-  CEFOXITIN: SIGNIFICANT CHANGE UP
-  CEFTRIAXONE: SIGNIFICANT CHANGE UP
-  CIPROFLOXACIN: SIGNIFICANT CHANGE UP
-  ERTAPENEM: SIGNIFICANT CHANGE UP
-  GENTAMICIN: SIGNIFICANT CHANGE UP
-  IMIPENEM: SIGNIFICANT CHANGE UP
-  LEVOFLOXACIN: SIGNIFICANT CHANGE UP
-  MEROPENEM: SIGNIFICANT CHANGE UP
-  NITROFURANTOIN: SIGNIFICANT CHANGE UP
-  PIPERACILLIN/TAZOBACTAM: SIGNIFICANT CHANGE UP
-  TIGECYCLINE: SIGNIFICANT CHANGE UP
-  TOBRAMYCIN: SIGNIFICANT CHANGE UP
-  TRIMETHOPRIM/SULFAMETHOXAZOLE: SIGNIFICANT CHANGE UP
ANION GAP SERPL CALC-SCNC: 6 MMOL/L — SIGNIFICANT CHANGE UP (ref 5–17)
BASOPHILS # BLD AUTO: 0.02 K/UL — SIGNIFICANT CHANGE UP (ref 0–0.2)
BASOPHILS NFR BLD AUTO: 0.2 % — SIGNIFICANT CHANGE UP (ref 0–2)
BUN SERPL-MCNC: 11 MG/DL — SIGNIFICANT CHANGE UP (ref 7–23)
CALCIUM SERPL-MCNC: 7.4 MG/DL — LOW (ref 8.5–10.1)
CHLORIDE SERPL-SCNC: 107 MMOL/L — SIGNIFICANT CHANGE UP (ref 96–108)
CO2 SERPL-SCNC: 27 MMOL/L — SIGNIFICANT CHANGE UP (ref 22–31)
CREAT SERPL-MCNC: 0.62 MG/DL — SIGNIFICANT CHANGE UP (ref 0.5–1.3)
CULTURE RESULTS: SIGNIFICANT CHANGE UP
EOSINOPHIL # BLD AUTO: 0.02 K/UL — SIGNIFICANT CHANGE UP (ref 0–0.5)
EOSINOPHIL NFR BLD AUTO: 0.2 % — SIGNIFICANT CHANGE UP (ref 0–6)
GLUCOSE SERPL-MCNC: 107 MG/DL — HIGH (ref 70–99)
HCT VFR BLD CALC: 30.6 % — LOW (ref 34.5–45)
HGB BLD-MCNC: 10.5 G/DL — LOW (ref 11.5–15.5)
IMM GRANULOCYTES NFR BLD AUTO: 0.9 % — SIGNIFICANT CHANGE UP (ref 0–1.5)
LYMPHOCYTES # BLD AUTO: 0.64 K/UL — LOW (ref 1–3.3)
LYMPHOCYTES # BLD AUTO: 6.1 % — LOW (ref 13–44)
MCHC RBC-ENTMCNC: 31 PG — SIGNIFICANT CHANGE UP (ref 27–34)
MCHC RBC-ENTMCNC: 34.3 GM/DL — SIGNIFICANT CHANGE UP (ref 32–36)
MCV RBC AUTO: 90.3 FL — SIGNIFICANT CHANGE UP (ref 80–100)
METHOD TYPE: SIGNIFICANT CHANGE UP
MONOCYTES # BLD AUTO: 0.86 K/UL — SIGNIFICANT CHANGE UP (ref 0–0.9)
MONOCYTES NFR BLD AUTO: 8.1 % — SIGNIFICANT CHANGE UP (ref 2–14)
NEUTROPHILS # BLD AUTO: 8.93 K/UL — HIGH (ref 1.8–7.4)
NEUTROPHILS NFR BLD AUTO: 84.5 % — HIGH (ref 43–77)
NRBC # BLD: 0 /100 WBCS — SIGNIFICANT CHANGE UP (ref 0–0)
ORGANISM # SPEC MICROSCOPIC CNT: SIGNIFICANT CHANGE UP
ORGANISM # SPEC MICROSCOPIC CNT: SIGNIFICANT CHANGE UP
PLATELET # BLD AUTO: 183 K/UL — SIGNIFICANT CHANGE UP (ref 150–400)
POTASSIUM SERPL-MCNC: 3.5 MMOL/L — SIGNIFICANT CHANGE UP (ref 3.5–5.3)
POTASSIUM SERPL-SCNC: 3.5 MMOL/L — SIGNIFICANT CHANGE UP (ref 3.5–5.3)
RBC # BLD: 3.39 M/UL — LOW (ref 3.8–5.2)
RBC # FLD: 12.9 % — SIGNIFICANT CHANGE UP (ref 10.3–14.5)
SODIUM SERPL-SCNC: 140 MMOL/L — SIGNIFICANT CHANGE UP (ref 135–145)
SPECIMEN SOURCE: SIGNIFICANT CHANGE UP
WBC # BLD: 10.57 K/UL — HIGH (ref 3.8–10.5)
WBC # FLD AUTO: 10.57 K/UL — HIGH (ref 3.8–10.5)

## 2022-02-15 PROCEDURE — 99232 SBSQ HOSP IP/OBS MODERATE 35: CPT

## 2022-02-15 PROCEDURE — 99233 SBSQ HOSP IP/OBS HIGH 50: CPT

## 2022-02-15 RX ADMIN — Medication 500 MILLIGRAM(S): at 05:33

## 2022-02-15 RX ADMIN — LEVETIRACETAM 1000 MILLIGRAM(S): 250 TABLET, FILM COATED ORAL at 05:33

## 2022-02-15 RX ADMIN — Medication 200 MILLIGRAM(S): at 14:10

## 2022-02-15 RX ADMIN — Medication 400 MILLIGRAM(S): at 14:09

## 2022-02-15 RX ADMIN — Medication 250 MILLIGRAM(S): at 17:56

## 2022-02-15 RX ADMIN — Medication 200 MILLIGRAM(S): at 21:23

## 2022-02-15 RX ADMIN — Medication 250 MILLIGRAM(S): at 05:33

## 2022-02-15 RX ADMIN — Medication 200 MILLIGRAM(S): at 05:33

## 2022-02-15 RX ADMIN — Medication 600 MILLIGRAM(S): at 17:56

## 2022-02-15 RX ADMIN — ENOXAPARIN SODIUM 40 MILLIGRAM(S): 100 INJECTION SUBCUTANEOUS at 14:10

## 2022-02-15 RX ADMIN — LEVETIRACETAM 1000 MILLIGRAM(S): 250 TABLET, FILM COATED ORAL at 14:11

## 2022-02-15 RX ADMIN — CEFTRIAXONE 100 MILLIGRAM(S): 500 INJECTION, POWDER, FOR SOLUTION INTRAMUSCULAR; INTRAVENOUS at 09:34

## 2022-02-15 NOTE — PROGRESS NOTE ADULT - PROBLEM SELECTOR PLAN 1
Stable urologically s/p left ureteral stent insertion. Await urine and blood culture results.
S/p left ureteral stent insertion

## 2022-02-15 NOTE — PROGRESS NOTE ADULT - ASSESSMENT
47y/o F. with hx of seizure disorder, who presented with left flank pain associated with chills. Found to have urosepsis in the setting of L ureteral calculus. S/p L ureteral stent placement by Urology yesterday, with purulent urine noted during procedure. Patient feels better and fever curve improved, leukocytosis also resolved. Blood cultures remained no growth, and OR culture growing E. coli.    Patient also incidentally COVID positive. Prior fevers mostly likely due to urosepsis, otherwise patient on room air and without respiratory symptoms. CXR without evidence of pneumonia. She has received COVID Moderna vaccine x 3. She does not qualify for remdesivir or steroids.    1. Sepsis 2/2 UTI in the setting of left ureteral calculus  -continue ceftriaxone pending E. coli susceptibilities, plan to switch to 2 weeks of PO antibiotics upon discharge  -follow cultures to completion    2. COVID  -continue supportive management  -AC per institution protocol      Yaquelin See MD  Division of Infectious Diseases   Cell [phone] between 8am and 6pm   After 6pm and weekends please call ID service at [phone].      47y/o F. with hx of seizure disorder, who presented with left flank pain associated with chills. Found to have urosepsis in the setting of L ureteral calculus. S/p L ureteral stent placement by Urology yesterday, with purulent urine noted during procedure. Patient feels better and fever curve improved, leukocytosis also resolved. Blood cultures remained no growth, and OR culture growing E. coli.    Patient also incidentally COVID positive. Prior fevers mostly likely due to urosepsis, otherwise patient on room air and without respiratory symptoms. CXR without evidence of pneumonia. She has received COVID Moderna vaccine x 3. She does not qualify for remdesivir or steroids.    1. Sepsis 2/2 UTI in the setting of left ureteral calculus  -continue ceftriaxone pending E. coli susceptibilities, plan to switch to 2 weeks of PO antibiotics upon discharge  -follow cultures to completion  -plan for close follow up with Urology for definitive stone management    2. COVID  -continue supportive management  -AC per institution protocol  -I will be covered by Dr. Marin tomorrow, 2/16/22    Yaquelin See MD  Division of Infectious Diseases   Cell [phone] between 8am and 6pm   After 6pm and weekends please call ID service at [phone].

## 2022-02-15 NOTE — PROGRESS NOTE ADULT - PROBLEM SELECTOR PLAN 2
Urine cx of urine from left kidney demonstrating E. coli. Patient is stable from urological standpoint. She will need to follow up within 2 weeks of discharge to make arrangements for definitive management of ureteral stone and stent. Need for follow up discussed in detail with patient.

## 2022-02-15 NOTE — PROGRESS NOTE ADULT - REASON FOR ADMISSION
obstructive uropathy/pyelonephritis

## 2022-02-16 ENCOUNTER — TRANSCRIPTION ENCOUNTER (OUTPATIENT)
Age: 47
End: 2022-02-16

## 2022-02-16 VITALS
HEART RATE: 69 BPM | DIASTOLIC BLOOD PRESSURE: 73 MMHG | OXYGEN SATURATION: 94 % | SYSTOLIC BLOOD PRESSURE: 135 MMHG | TEMPERATURE: 98 F | RESPIRATION RATE: 17 BRPM

## 2022-02-16 LAB
-  AMIKACIN: SIGNIFICANT CHANGE UP
-  AMOXICILLIN/CLAVULANIC ACID: SIGNIFICANT CHANGE UP
-  AMPICILLIN/SULBACTAM: SIGNIFICANT CHANGE UP
-  AMPICILLIN: SIGNIFICANT CHANGE UP
-  AZTREONAM: SIGNIFICANT CHANGE UP
-  CEFAZOLIN: SIGNIFICANT CHANGE UP
-  CEFEPIME: SIGNIFICANT CHANGE UP
-  CEFOXITIN: SIGNIFICANT CHANGE UP
-  CEFTRIAXONE: SIGNIFICANT CHANGE UP
-  CIPROFLOXACIN: SIGNIFICANT CHANGE UP
-  ERTAPENEM: SIGNIFICANT CHANGE UP
-  GENTAMICIN: SIGNIFICANT CHANGE UP
-  IMIPENEM: SIGNIFICANT CHANGE UP
-  LEVOFLOXACIN: SIGNIFICANT CHANGE UP
-  MEROPENEM: SIGNIFICANT CHANGE UP
-  NITROFURANTOIN: SIGNIFICANT CHANGE UP
-  PIPERACILLIN/TAZOBACTAM: SIGNIFICANT CHANGE UP
-  TIGECYCLINE: SIGNIFICANT CHANGE UP
-  TOBRAMYCIN: SIGNIFICANT CHANGE UP
-  TRIMETHOPRIM/SULFAMETHOXAZOLE: SIGNIFICANT CHANGE UP
ANION GAP SERPL CALC-SCNC: 5 MMOL/L — SIGNIFICANT CHANGE UP (ref 5–17)
BASOPHILS # BLD AUTO: 0.03 K/UL — SIGNIFICANT CHANGE UP (ref 0–0.2)
BASOPHILS NFR BLD AUTO: 0.5 % — SIGNIFICANT CHANGE UP (ref 0–2)
BUN SERPL-MCNC: 9 MG/DL — SIGNIFICANT CHANGE UP (ref 7–23)
CALCIUM SERPL-MCNC: 7.6 MG/DL — LOW (ref 8.5–10.1)
CHLORIDE SERPL-SCNC: 107 MMOL/L — SIGNIFICANT CHANGE UP (ref 96–108)
CO2 SERPL-SCNC: 27 MMOL/L — SIGNIFICANT CHANGE UP (ref 22–31)
CREAT SERPL-MCNC: 0.58 MG/DL — SIGNIFICANT CHANGE UP (ref 0.5–1.3)
CULTURE RESULTS: SIGNIFICANT CHANGE UP
EOSINOPHIL # BLD AUTO: 0.05 K/UL — SIGNIFICANT CHANGE UP (ref 0–0.5)
EOSINOPHIL NFR BLD AUTO: 0.8 % — SIGNIFICANT CHANGE UP (ref 0–6)
GLUCOSE SERPL-MCNC: 96 MG/DL — SIGNIFICANT CHANGE UP (ref 70–99)
HCT VFR BLD CALC: 32 % — LOW (ref 34.5–45)
HGB BLD-MCNC: 10.8 G/DL — LOW (ref 11.5–15.5)
IMM GRANULOCYTES NFR BLD AUTO: 0.6 % — SIGNIFICANT CHANGE UP (ref 0–1.5)
LYMPHOCYTES # BLD AUTO: 0.76 K/UL — LOW (ref 1–3.3)
LYMPHOCYTES # BLD AUTO: 11.4 % — LOW (ref 13–44)
MCHC RBC-ENTMCNC: 30.3 PG — SIGNIFICANT CHANGE UP (ref 27–34)
MCHC RBC-ENTMCNC: 33.8 GM/DL — SIGNIFICANT CHANGE UP (ref 32–36)
MCV RBC AUTO: 89.9 FL — SIGNIFICANT CHANGE UP (ref 80–100)
METHOD TYPE: SIGNIFICANT CHANGE UP
MONOCYTES # BLD AUTO: 0.65 K/UL — SIGNIFICANT CHANGE UP (ref 0–0.9)
MONOCYTES NFR BLD AUTO: 9.8 % — SIGNIFICANT CHANGE UP (ref 2–14)
NEUTROPHILS # BLD AUTO: 5.13 K/UL — SIGNIFICANT CHANGE UP (ref 1.8–7.4)
NEUTROPHILS NFR BLD AUTO: 76.9 % — SIGNIFICANT CHANGE UP (ref 43–77)
NRBC # BLD: 0 /100 WBCS — SIGNIFICANT CHANGE UP (ref 0–0)
ORGANISM # SPEC MICROSCOPIC CNT: SIGNIFICANT CHANGE UP
ORGANISM # SPEC MICROSCOPIC CNT: SIGNIFICANT CHANGE UP
PLATELET # BLD AUTO: 184 K/UL — SIGNIFICANT CHANGE UP (ref 150–400)
POTASSIUM SERPL-MCNC: 3.8 MMOL/L — SIGNIFICANT CHANGE UP (ref 3.5–5.3)
POTASSIUM SERPL-SCNC: 3.8 MMOL/L — SIGNIFICANT CHANGE UP (ref 3.5–5.3)
RBC # BLD: 3.56 M/UL — LOW (ref 3.8–5.2)
RBC # FLD: 12.6 % — SIGNIFICANT CHANGE UP (ref 10.3–14.5)
SODIUM SERPL-SCNC: 139 MMOL/L — SIGNIFICANT CHANGE UP (ref 135–145)
SPECIMEN SOURCE: SIGNIFICANT CHANGE UP
WBC # BLD: 6.66 K/UL — SIGNIFICANT CHANGE UP (ref 3.8–10.5)
WBC # FLD AUTO: 6.66 K/UL — SIGNIFICANT CHANGE UP (ref 3.8–10.5)

## 2022-02-16 PROCEDURE — 83605 ASSAY OF LACTIC ACID: CPT

## 2022-02-16 PROCEDURE — 82962 GLUCOSE BLOOD TEST: CPT

## 2022-02-16 PROCEDURE — 86900 BLOOD TYPING SEROLOGIC ABO: CPT

## 2022-02-16 PROCEDURE — 84702 CHORIONIC GONADOTROPIN TEST: CPT

## 2022-02-16 PROCEDURE — 84100 ASSAY OF PHOSPHORUS: CPT

## 2022-02-16 PROCEDURE — 76000 FLUOROSCOPY <1 HR PHYS/QHP: CPT

## 2022-02-16 PROCEDURE — 99239 HOSP IP/OBS DSCHRG MGMT >30: CPT

## 2022-02-16 PROCEDURE — 99285 EMERGENCY DEPT VISIT HI MDM: CPT

## 2022-02-16 PROCEDURE — 71046 X-RAY EXAM CHEST 2 VIEWS: CPT

## 2022-02-16 PROCEDURE — 83735 ASSAY OF MAGNESIUM: CPT

## 2022-02-16 PROCEDURE — 85610 PROTHROMBIN TIME: CPT

## 2022-02-16 PROCEDURE — 85730 THROMBOPLASTIN TIME PARTIAL: CPT

## 2022-02-16 PROCEDURE — 87086 URINE CULTURE/COLONY COUNT: CPT

## 2022-02-16 PROCEDURE — C1769: CPT

## 2022-02-16 PROCEDURE — 80156 ASSAY CARBAMAZEPINE TOTAL: CPT

## 2022-02-16 PROCEDURE — 85025 COMPLETE CBC W/AUTO DIFF WBC: CPT

## 2022-02-16 PROCEDURE — 85018 HEMOGLOBIN: CPT

## 2022-02-16 PROCEDURE — 96375 TX/PRO/DX INJ NEW DRUG ADDON: CPT

## 2022-02-16 PROCEDURE — 36415 COLL VENOUS BLD VENIPUNCTURE: CPT

## 2022-02-16 PROCEDURE — 81001 URINALYSIS AUTO W/SCOPE: CPT

## 2022-02-16 PROCEDURE — 83690 ASSAY OF LIPASE: CPT

## 2022-02-16 PROCEDURE — 87186 SC STD MICRODIL/AGAR DIL: CPT

## 2022-02-16 PROCEDURE — 80053 COMPREHEN METABOLIC PANEL: CPT

## 2022-02-16 PROCEDURE — 87635 SARS-COV-2 COVID-19 AMP PRB: CPT

## 2022-02-16 PROCEDURE — 96367 TX/PROPH/DG ADDL SEQ IV INF: CPT

## 2022-02-16 PROCEDURE — 96365 THER/PROPH/DIAG IV INF INIT: CPT

## 2022-02-16 PROCEDURE — 86901 BLOOD TYPING SEROLOGIC RH(D): CPT

## 2022-02-16 PROCEDURE — 74176 CT ABD & PELVIS W/O CONTRAST: CPT | Mod: MA

## 2022-02-16 PROCEDURE — 87040 BLOOD CULTURE FOR BACTERIA: CPT

## 2022-02-16 PROCEDURE — 85014 HEMATOCRIT: CPT

## 2022-02-16 PROCEDURE — 93005 ELECTROCARDIOGRAM TRACING: CPT

## 2022-02-16 PROCEDURE — C2617: CPT

## 2022-02-16 PROCEDURE — 86850 RBC ANTIBODY SCREEN: CPT

## 2022-02-16 PROCEDURE — 80048 BASIC METABOLIC PNL TOTAL CA: CPT

## 2022-02-16 PROCEDURE — C1758: CPT

## 2022-02-16 RX ORDER — CIPROFLOXACIN LACTATE 400MG/40ML
1 VIAL (ML) INTRAVENOUS
Qty: 28 | Refills: 0
Start: 2022-02-16 | End: 2022-03-01

## 2022-02-16 RX ORDER — ACETAMINOPHEN 500 MG
2 TABLET ORAL
Qty: 0 | Refills: 0 | DISCHARGE
Start: 2022-02-16

## 2022-02-16 RX ORDER — SACCHAROMYCES BOULARDII 250 MG
1 POWDER IN PACKET (EA) ORAL
Qty: 28 | Refills: 0
Start: 2022-02-16 | End: 2022-03-01

## 2022-02-16 RX ORDER — CIPROFLOXACIN LACTATE 400MG/40ML
500 VIAL (ML) INTRAVENOUS EVERY 12 HOURS
Refills: 0 | Status: DISCONTINUED | OUTPATIENT
Start: 2022-02-16 | End: 2022-02-16

## 2022-02-16 RX ADMIN — Medication 250 MILLIGRAM(S): at 10:01

## 2022-02-16 RX ADMIN — Medication 200 MILLIGRAM(S): at 14:01

## 2022-02-16 RX ADMIN — CEFTRIAXONE 100 MILLIGRAM(S): 500 INJECTION, POWDER, FOR SOLUTION INTRAMUSCULAR; INTRAVENOUS at 10:02

## 2022-02-16 RX ADMIN — Medication 500 MILLIGRAM(S): at 06:00

## 2022-02-16 RX ADMIN — LEVETIRACETAM 1000 MILLIGRAM(S): 250 TABLET, FILM COATED ORAL at 06:00

## 2022-02-16 RX ADMIN — Medication 400 MILLIGRAM(S): at 14:01

## 2022-02-16 RX ADMIN — ENOXAPARIN SODIUM 40 MILLIGRAM(S): 100 INJECTION SUBCUTANEOUS at 11:14

## 2022-02-16 RX ADMIN — Medication 200 MILLIGRAM(S): at 06:00

## 2022-02-16 NOTE — DISCHARGE NOTE NURSING/CASE MANAGEMENT/SOCIAL WORK - NSDCPEFALRISK_GEN_ALL_CORE
For information on Fall & Injury Prevention, visit: [email or web address] OR  [email or web address] OR  https://www.cdc.gov/steadi/patient.html

## 2022-02-16 NOTE — DISCHARGE NOTE NURSING/CASE MANAGEMENT/SOCIAL WORK - PATIENT PORTAL LINK FT
You can access the FollowMyHealth Patient Portal offered by [facility] by registering at the following website: [email or web address]. By joining [employer]’s FollowMyHealth portal, you will also be able to view your health information using other applications (apps) compatible with our system.

## 2022-02-18 LAB
CULTURE RESULTS: SIGNIFICANT CHANGE UP
CULTURE RESULTS: SIGNIFICANT CHANGE UP
SPECIMEN SOURCE: SIGNIFICANT CHANGE UP
SPECIMEN SOURCE: SIGNIFICANT CHANGE UP

## 2022-02-23 ENCOUNTER — APPOINTMENT (OUTPATIENT)
Dept: RADIOLOGY | Facility: CLINIC | Age: 47
End: 2022-02-23
Payer: MEDICAID

## 2022-02-23 ENCOUNTER — OUTPATIENT (OUTPATIENT)
Dept: OUTPATIENT SERVICES | Facility: HOSPITAL | Age: 47
LOS: 1 days | End: 2022-02-23
Payer: MEDICAID

## 2022-02-23 ENCOUNTER — APPOINTMENT (OUTPATIENT)
Dept: UROLOGY | Facility: CLINIC | Age: 47
End: 2022-02-23
Payer: MEDICAID

## 2022-02-23 VITALS
RESPIRATION RATE: 16 BRPM | DIASTOLIC BLOOD PRESSURE: 82 MMHG | SYSTOLIC BLOOD PRESSURE: 145 MMHG | OXYGEN SATURATION: 100 % | WEIGHT: 122 LBS | TEMPERATURE: 98.9 F | HEIGHT: 64 IN | HEART RATE: 82 BPM | BODY MASS INDEX: 20.83 KG/M2

## 2022-02-23 DIAGNOSIS — N20.1 CALCULUS OF URETER: ICD-10-CM

## 2022-02-23 DIAGNOSIS — Z78.9 OTHER SPECIFIED HEALTH STATUS: ICD-10-CM

## 2022-02-23 DIAGNOSIS — G40.909 EPILEPSY, UNSPECIFIED, NOT INTRACTABLE, W/OUT STATUS EPILEPTICUS: ICD-10-CM

## 2022-02-23 PROCEDURE — 74018 RADEX ABDOMEN 1 VIEW: CPT

## 2022-02-23 PROCEDURE — 74018 RADEX ABDOMEN 1 VIEW: CPT | Mod: 26

## 2022-02-23 PROCEDURE — 99204 OFFICE O/P NEW MOD 45 MIN: CPT

## 2022-02-24 PROBLEM — Z78.9 NO FAMILY HISTORY OF HYPERTENSION: Status: ACTIVE | Noted: 2022-02-24

## 2022-02-24 PROBLEM — G40.909 EPILEPSY: Status: RESOLVED | Noted: 2022-02-24 | Resolved: 2022-02-24

## 2022-02-24 NOTE — HISTORY OF PRESENT ILLNESS
[FreeTextEntry1] : This patient presents having had a left ureteral stent placed for ureteral stone elsewhere.  She states she had formed one other stone in her life.  She denies other urinary problems.

## 2022-02-24 NOTE — END OF VISIT
[FreeTextEntry3] : A KUB x-ray is ordered to see if the stone is lucent or opaque.  If loosens medical dissolution may be attempted.  Also explained ureteroscopy and all risks and counter with that procedure.  Prevention was gone over and as her stone formation has been minimal she will start increased hydration the avoidance of oxalates and use of lemon lime-based beverages.  A renal sonogram will be done every year

## 2022-02-24 NOTE — PHYSICAL EXAM
[General Appearance - Well Developed] : well developed [General Appearance - Well Nourished] : well nourished [Normal Appearance] : normal appearance [Well Groomed] : well groomed [General Appearance - In No Acute Distress] : no acute distress [Edema] : no peripheral edema [Respiration, Rhythm And Depth] : normal respiratory rhythm and effort [Exaggerated Use Of Accessory Muscles For Inspiration] : no accessory muscle use [Abdomen Soft] : soft [Abdomen Tenderness] : non-tender [Costovertebral Angle Tenderness] : no ~M costovertebral angle tenderness [Urinary Bladder Findings] : the bladder was normal on palpation [Normal Station and Gait] : the gait and station were normal for the patient's age [] : no rash [No Focal Deficits] : no focal deficits [Oriented To Time, Place, And Person] : oriented to person, place, and time [Affect] : the affect was normal [Mood] : the mood was normal [No Palpable Adenopathy] : no palpable adenopathy [Not Anxious] : not anxious

## 2022-02-28 ENCOUNTER — NON-APPOINTMENT (OUTPATIENT)
Age: 47
End: 2022-02-28

## 2022-03-01 ENCOUNTER — NON-APPOINTMENT (OUTPATIENT)
Age: 47
End: 2022-03-01

## 2022-03-13 LAB
APPEARANCE: ABNORMAL
BACTERIA UR CULT: NORMAL
BACTERIA: NEGATIVE
BILIRUBIN URINE: NEGATIVE
BLOOD URINE: ABNORMAL
COLOR: YELLOW
GLUCOSE QUALITATIVE U: NEGATIVE
HYALINE CASTS: 2 /LPF
KETONES URINE: NEGATIVE
LEUKOCYTE ESTERASE URINE: ABNORMAL
MICROSCOPIC-UA: NORMAL
NITRITE URINE: NEGATIVE
PH URINE: 6.5
PROTEIN URINE: ABNORMAL
RED BLOOD CELLS URINE: 531 /HPF
SPECIFIC GRAVITY URINE: 1.02
SQUAMOUS EPITHELIAL CELLS: 4 /HPF
UROBILINOGEN URINE: NORMAL
WHITE BLOOD CELLS URINE: 7 /HPF

## 2022-03-21 LAB — URINE CYTOLOGY: NORMAL

## 2022-03-29 ENCOUNTER — OUTPATIENT (OUTPATIENT)
Dept: OUTPATIENT SERVICES | Facility: HOSPITAL | Age: 47
LOS: 1 days | End: 2022-03-29
Payer: MEDICAID

## 2022-03-29 VITALS
DIASTOLIC BLOOD PRESSURE: 79 MMHG | RESPIRATION RATE: 16 BRPM | WEIGHT: 125.66 LBS | HEART RATE: 80 BPM | HEIGHT: 64 IN | TEMPERATURE: 98 F | SYSTOLIC BLOOD PRESSURE: 120 MMHG

## 2022-03-29 DIAGNOSIS — N20.1 CALCULUS OF URETER: ICD-10-CM

## 2022-03-29 DIAGNOSIS — Z98.891 HISTORY OF UTERINE SCAR FROM PREVIOUS SURGERY: Chronic | ICD-10-CM

## 2022-03-29 DIAGNOSIS — Z01.818 ENCOUNTER FOR OTHER PREPROCEDURAL EXAMINATION: ICD-10-CM

## 2022-03-29 DIAGNOSIS — G40.909 EPILEPSY, UNSPECIFIED, NOT INTRACTABLE, WITHOUT STATUS EPILEPTICUS: ICD-10-CM

## 2022-03-29 DIAGNOSIS — N20.0 CALCULUS OF KIDNEY: ICD-10-CM

## 2022-03-29 DIAGNOSIS — Z98.890 OTHER SPECIFIED POSTPROCEDURAL STATES: Chronic | ICD-10-CM

## 2022-03-29 LAB
ANION GAP SERPL CALC-SCNC: 2 MMOL/L — LOW (ref 5–17)
APPEARANCE UR: CLEAR — SIGNIFICANT CHANGE UP
APTT BLD: 30.2 SEC — SIGNIFICANT CHANGE UP (ref 27.5–35.5)
BASOPHILS # BLD AUTO: 0.03 K/UL — SIGNIFICANT CHANGE UP (ref 0–0.2)
BASOPHILS NFR BLD AUTO: 0.6 % — SIGNIFICANT CHANGE UP (ref 0–2)
BILIRUB UR-MCNC: NEGATIVE — SIGNIFICANT CHANGE UP
BUN SERPL-MCNC: 20 MG/DL — SIGNIFICANT CHANGE UP (ref 7–23)
CALCIUM SERPL-MCNC: 8.6 MG/DL — SIGNIFICANT CHANGE UP (ref 8.5–10.1)
CHLORIDE SERPL-SCNC: 106 MMOL/L — SIGNIFICANT CHANGE UP (ref 96–108)
CO2 SERPL-SCNC: 30 MMOL/L — SIGNIFICANT CHANGE UP (ref 22–31)
COLOR SPEC: YELLOW — SIGNIFICANT CHANGE UP
CREAT SERPL-MCNC: 0.54 MG/DL — SIGNIFICANT CHANGE UP (ref 0.5–1.3)
DIFF PNL FLD: ABNORMAL
EGFR: 114 ML/MIN/1.73M2 — SIGNIFICANT CHANGE UP
EOSINOPHIL # BLD AUTO: 0.07 K/UL — SIGNIFICANT CHANGE UP (ref 0–0.5)
EOSINOPHIL NFR BLD AUTO: 1.3 % — SIGNIFICANT CHANGE UP (ref 0–6)
GLUCOSE SERPL-MCNC: 92 MG/DL — SIGNIFICANT CHANGE UP (ref 70–99)
GLUCOSE UR QL: NEGATIVE — SIGNIFICANT CHANGE UP
HCT VFR BLD CALC: 38.4 % — SIGNIFICANT CHANGE UP (ref 34.5–45)
HGB BLD-MCNC: 12.5 G/DL — SIGNIFICANT CHANGE UP (ref 11.5–15.5)
IMM GRANULOCYTES NFR BLD AUTO: 0.2 % — SIGNIFICANT CHANGE UP (ref 0–1.5)
INR BLD: 1.02 RATIO — SIGNIFICANT CHANGE UP (ref 0.88–1.16)
KETONES UR-MCNC: NEGATIVE — SIGNIFICANT CHANGE UP
LEUKOCYTE ESTERASE UR-ACNC: ABNORMAL
LYMPHOCYTES # BLD AUTO: 1.67 K/UL — SIGNIFICANT CHANGE UP (ref 1–3.3)
LYMPHOCYTES # BLD AUTO: 31.2 % — SIGNIFICANT CHANGE UP (ref 13–44)
MCHC RBC-ENTMCNC: 30.6 PG — SIGNIFICANT CHANGE UP (ref 27–34)
MCHC RBC-ENTMCNC: 32.6 GM/DL — SIGNIFICANT CHANGE UP (ref 32–36)
MCV RBC AUTO: 94.1 FL — SIGNIFICANT CHANGE UP (ref 80–100)
MONOCYTES # BLD AUTO: 0.46 K/UL — SIGNIFICANT CHANGE UP (ref 0–0.9)
MONOCYTES NFR BLD AUTO: 8.6 % — SIGNIFICANT CHANGE UP (ref 2–14)
NEUTROPHILS # BLD AUTO: 3.11 K/UL — SIGNIFICANT CHANGE UP (ref 1.8–7.4)
NEUTROPHILS NFR BLD AUTO: 58.1 % — SIGNIFICANT CHANGE UP (ref 43–77)
NITRITE UR-MCNC: NEGATIVE — SIGNIFICANT CHANGE UP
PH UR: 6.5 — SIGNIFICANT CHANGE UP (ref 5–8)
PLATELET # BLD AUTO: 286 K/UL — SIGNIFICANT CHANGE UP (ref 150–400)
POTASSIUM SERPL-MCNC: 4.5 MMOL/L — SIGNIFICANT CHANGE UP (ref 3.5–5.3)
POTASSIUM SERPL-SCNC: 4.5 MMOL/L — SIGNIFICANT CHANGE UP (ref 3.5–5.3)
PROT UR-MCNC: 15
PROTHROM AB SERPL-ACNC: 11.8 SEC — SIGNIFICANT CHANGE UP (ref 10.5–13.4)
RBC # BLD: 4.08 M/UL — SIGNIFICANT CHANGE UP (ref 3.8–5.2)
RBC # FLD: 13.3 % — SIGNIFICANT CHANGE UP (ref 10.3–14.5)
SODIUM SERPL-SCNC: 138 MMOL/L — SIGNIFICANT CHANGE UP (ref 135–145)
SP GR SPEC: 1.01 — SIGNIFICANT CHANGE UP (ref 1.01–1.02)
UROBILINOGEN FLD QL: NEGATIVE — SIGNIFICANT CHANGE UP
WBC # BLD: 5.35 K/UL — SIGNIFICANT CHANGE UP (ref 3.8–10.5)
WBC # FLD AUTO: 5.35 K/UL — SIGNIFICANT CHANGE UP (ref 3.8–10.5)

## 2022-03-29 PROCEDURE — 86900 BLOOD TYPING SEROLOGIC ABO: CPT

## 2022-03-29 PROCEDURE — 86901 BLOOD TYPING SEROLOGIC RH(D): CPT

## 2022-03-29 PROCEDURE — 87077 CULTURE AEROBIC IDENTIFY: CPT

## 2022-03-29 PROCEDURE — 85610 PROTHROMBIN TIME: CPT

## 2022-03-29 PROCEDURE — 86850 RBC ANTIBODY SCREEN: CPT

## 2022-03-29 PROCEDURE — 85025 COMPLETE CBC W/AUTO DIFF WBC: CPT

## 2022-03-29 PROCEDURE — 36415 COLL VENOUS BLD VENIPUNCTURE: CPT

## 2022-03-29 PROCEDURE — 85730 THROMBOPLASTIN TIME PARTIAL: CPT

## 2022-03-29 PROCEDURE — 80048 BASIC METABOLIC PNL TOTAL CA: CPT

## 2022-03-29 PROCEDURE — 87086 URINE CULTURE/COLONY COUNT: CPT

## 2022-03-29 PROCEDURE — 81001 URINALYSIS AUTO W/SCOPE: CPT

## 2022-03-29 NOTE — H&P PST ADULT - ASSESSMENT
47 y/o female presents to [facility] for scheduled cystoscopy ureteroscopy, left lithotripsy of stone with ureteral stent on 4/4/22.

## 2022-03-29 NOTE — H&P PST ADULT - HEALTH CARE MAINTENANCE
no recent travels outside of the country or states within the last 14 days, no fever, URI, SOB or recent change /loss in smell or taste  recent hx of covid19 positive 2/13/22

## 2022-03-29 NOTE — H&P PST ADULT - HISTORY OF PRESENT ILLNESS
47 y/o female presents to [facility] for scheduled cystoscopy ureteroscopy, lithotripsy of stone with ureteral stent on 4/4/22. Patient s/p recent admission to [facility] for sepsis related to kidney stone on 2/14/22. Patient was seen in the ED due to increased urination, left flank pain associated with 105.1 temp.  Patient follow up done outpatient advised to have scheduled procedure.

## 2022-03-29 NOTE — H&P PST ADULT - NSANTHOSAYNRD_GEN_A_CORE
No. GER screening performed.  STOP BANG Legend: 0-2 = LOW Risk; 3-4 = INTERMEDIATE Risk; 5-8 = HIGH Risk

## 2022-03-29 NOTE — H&P PST ADULT - PROBLEM SELECTOR PLAN 1
Pre op  instructions given and explained.  Avoid NSAIDs and OTC supplements.   Patient verbalized understanding  medical consult requested by surgeon  recent covid19 positive 2/13/22 will follow up with surgeon as under 90 day period

## 2022-03-30 DIAGNOSIS — N20.1 CALCULUS OF URETER: ICD-10-CM

## 2022-03-30 DIAGNOSIS — Z01.818 ENCOUNTER FOR OTHER PREPROCEDURAL EXAMINATION: ICD-10-CM

## 2022-03-31 LAB
CULTURE RESULTS: SIGNIFICANT CHANGE UP
SPECIMEN SOURCE: SIGNIFICANT CHANGE UP

## 2022-04-01 RX ORDER — CEFUROXIME AXETIL 500 MG/1
500 TABLET ORAL
Qty: 14 | Refills: 0 | Status: ACTIVE | COMMUNITY
Start: 2022-04-01 | End: 1900-01-01

## 2022-04-04 ENCOUNTER — TRANSCRIPTION ENCOUNTER (OUTPATIENT)
Age: 47
End: 2022-04-04

## 2022-04-04 ENCOUNTER — OUTPATIENT (OUTPATIENT)
Dept: INPATIENT UNIT | Facility: HOSPITAL | Age: 47
LOS: 1 days | Discharge: HOME | End: 2022-04-04
Payer: MEDICAID

## 2022-04-04 ENCOUNTER — RESULT REVIEW (OUTPATIENT)
Age: 47
End: 2022-04-04

## 2022-04-04 ENCOUNTER — APPOINTMENT (OUTPATIENT)
Dept: UROLOGY | Facility: HOSPITAL | Age: 47
End: 2022-04-04

## 2022-04-04 VITALS
SYSTOLIC BLOOD PRESSURE: 139 MMHG | RESPIRATION RATE: 16 BRPM | OXYGEN SATURATION: 100 % | WEIGHT: 125.66 LBS | HEIGHT: 64 IN | HEART RATE: 71 BPM | TEMPERATURE: 97 F | DIASTOLIC BLOOD PRESSURE: 87 MMHG

## 2022-04-04 VITALS
OXYGEN SATURATION: 100 % | DIASTOLIC BLOOD PRESSURE: 82 MMHG | RESPIRATION RATE: 15 BRPM | TEMPERATURE: 97 F | HEART RATE: 57 BPM | SYSTOLIC BLOOD PRESSURE: 142 MMHG

## 2022-04-04 DIAGNOSIS — Z98.890 OTHER SPECIFIED POSTPROCEDURAL STATES: Chronic | ICD-10-CM

## 2022-04-04 DIAGNOSIS — N20.1 CALCULUS OF URETER: ICD-10-CM

## 2022-04-04 DIAGNOSIS — Z98.891 HISTORY OF UTERINE SCAR FROM PREVIOUS SURGERY: Chronic | ICD-10-CM

## 2022-04-04 LAB — HCG UR QL: NEGATIVE — SIGNIFICANT CHANGE UP

## 2022-04-04 PROCEDURE — C2617: CPT

## 2022-04-04 PROCEDURE — C1889: CPT

## 2022-04-04 PROCEDURE — 87086 URINE CULTURE/COLONY COUNT: CPT

## 2022-04-04 PROCEDURE — C1894: CPT

## 2022-04-04 PROCEDURE — 87116 MYCOBACTERIA CULTURE: CPT

## 2022-04-04 PROCEDURE — 87206 SMEAR FLUORESCENT/ACID STAI: CPT

## 2022-04-04 PROCEDURE — 87102 FUNGUS ISOLATION CULTURE: CPT

## 2022-04-04 PROCEDURE — 76000 FLUOROSCOPY <1 HR PHYS/QHP: CPT

## 2022-04-04 PROCEDURE — 52353 CYSTOURETERO W/LITHOTRIPSY: CPT | Mod: LT

## 2022-04-04 PROCEDURE — 81025 URINE PREGNANCY TEST: CPT

## 2022-04-04 PROCEDURE — C1769: CPT

## 2022-04-04 PROCEDURE — 88300 SURGICAL PATH GROSS: CPT | Mod: 26

## 2022-04-04 PROCEDURE — 87015 SPECIMEN INFECT AGNT CONCNTJ: CPT

## 2022-04-04 PROCEDURE — 88300 SURGICAL PATH GROSS: CPT

## 2022-04-04 PROCEDURE — 82365 CALCULUS SPECTROSCOPY: CPT

## 2022-04-04 RX ORDER — CEFUROXIME AXETIL 250 MG
1 TABLET ORAL
Qty: 0 | Refills: 0 | DISCHARGE

## 2022-04-04 RX ORDER — LEVETIRACETAM 250 MG/1
2 TABLET, FILM COATED ORAL
Qty: 0 | Refills: 0 | DISCHARGE

## 2022-04-04 RX ORDER — CARBAMAZEPINE 200 MG
2 TABLET ORAL
Qty: 0 | Refills: 0 | DISCHARGE

## 2022-04-04 RX ORDER — CARBAMAZEPINE 200 MG
2.5 TABLET ORAL
Qty: 0 | Refills: 0 | DISCHARGE

## 2022-04-04 RX ORDER — PHENAZOPYRIDINE HCL 100 MG
200 TABLET ORAL ONCE
Refills: 0 | Status: COMPLETED | OUTPATIENT
Start: 2022-04-04 | End: 2022-04-04

## 2022-04-04 RX ORDER — ASCORBIC ACID 60 MG
1 TABLET,CHEWABLE ORAL
Qty: 0 | Refills: 0 | DISCHARGE

## 2022-04-04 RX ORDER — FENTANYL CITRATE 50 UG/ML
50 INJECTION INTRAVENOUS
Refills: 0 | Status: DISCONTINUED | OUTPATIENT
Start: 2022-04-04 | End: 2022-04-04

## 2022-04-04 RX ORDER — ONDANSETRON 8 MG/1
4 TABLET, FILM COATED ORAL ONCE
Refills: 0 | Status: DISCONTINUED | OUTPATIENT
Start: 2022-04-04 | End: 2022-04-04

## 2022-04-04 RX ORDER — ERGOCALCIFEROL 1.25 MG/1
1 CAPSULE ORAL
Qty: 0 | Refills: 0 | DISCHARGE

## 2022-04-04 RX ORDER — HYDROMORPHONE HYDROCHLORIDE 2 MG/ML
0.5 INJECTION INTRAMUSCULAR; INTRAVENOUS; SUBCUTANEOUS
Refills: 0 | Status: DISCONTINUED | OUTPATIENT
Start: 2022-04-04 | End: 2022-04-04

## 2022-04-04 RX ORDER — CARBAMAZEPINE 200 MG
3 TABLET ORAL
Qty: 0 | Refills: 0 | DISCHARGE

## 2022-04-04 RX ORDER — OXYCODONE HYDROCHLORIDE 5 MG/1
5 TABLET ORAL ONCE
Refills: 0 | Status: DISCONTINUED | OUTPATIENT
Start: 2022-04-04 | End: 2022-04-04

## 2022-04-04 RX ORDER — PHENAZOPYRIDINE HCL 100 MG
1 TABLET ORAL
Qty: 9 | Refills: 0
Start: 2022-04-04 | End: 2022-04-06

## 2022-04-04 RX ORDER — CARBAMAZEPINE 200 MG
7.5 TABLET ORAL
Qty: 0 | Refills: 0 | DISCHARGE

## 2022-04-04 RX ORDER — ACETAMINOPHEN 500 MG
1000 TABLET ORAL ONCE
Refills: 0 | Status: DISCONTINUED | OUTPATIENT
Start: 2022-04-04 | End: 2022-04-04

## 2022-04-04 RX ORDER — SODIUM CHLORIDE 9 MG/ML
1000 INJECTION, SOLUTION INTRAVENOUS
Refills: 0 | Status: DISCONTINUED | OUTPATIENT
Start: 2022-04-04 | End: 2022-04-04

## 2022-04-04 RX ADMIN — SODIUM CHLORIDE 75 MILLILITER(S): 9 INJECTION, SOLUTION INTRAVENOUS at 11:09

## 2022-04-04 RX ADMIN — Medication 200 MILLIGRAM(S): at 11:08

## 2022-04-04 NOTE — ASU DISCHARGE PLAN (ADULT/PEDIATRIC) - CARE PROVIDER_API CALL
Chilo Lemon)  Urology  [address], [address] [place] [address]  Phone: [phone]  Fax: [phone]  Scheduled Appointment: 04/08/2022 09:00 AM

## 2022-04-04 NOTE — ASU PATIENT PROFILE, ADULT - FALL HARM RISK - UNIVERSAL INTERVENTIONS
Bed in lowest position, wheels locked, appropriate side rails in place/Call bell, personal items and telephone in reach/Instruct patient to call for assistance before getting out of bed or chair/Non-slip footwear when patient is out of bed/[place] to call system/Physically safe environment - no spills, clutter or unnecessary equipment/Purposeful Proactive Rounding/Room/bathroom lighting operational, light cord in reach

## 2022-04-04 NOTE — ASU PATIENT PROFILE, ADULT - NSICDXPASTMEDICALHX_GEN_ALL_CORE_FT
PAST MEDICAL HISTORY:  COVID     Epilepsy     H/O sepsis     Kidney stone on left side     Obstructive uropathy     Pyelonephritis

## 2022-04-04 NOTE — ASU DISCHARGE PLAN (ADULT/PEDIATRIC) - NS MD DC FALL RISK RISK
For information on Fall & Injury Prevention, visit: [email or web address] OR  [email or web address] OR  https://www.cdc.gov/steadi/patient.html

## 2022-04-08 ENCOUNTER — APPOINTMENT (OUTPATIENT)
Dept: UROLOGY | Facility: CLINIC | Age: 47
End: 2022-04-08
Payer: MEDICAID

## 2022-04-08 VITALS
HEART RATE: 70 BPM | DIASTOLIC BLOOD PRESSURE: 83 MMHG | OXYGEN SATURATION: 98 % | BODY MASS INDEX: 20.49 KG/M2 | HEIGHT: 64 IN | SYSTOLIC BLOOD PRESSURE: 142 MMHG | WEIGHT: 120 LBS

## 2022-04-08 DIAGNOSIS — Z46.6 ENCOUNTER FOR FITTING AND ADJUSTMENT OF URINARY DEVICE: ICD-10-CM

## 2022-04-08 PROBLEM — N13.9 OBSTRUCTIVE AND REFLUX UROPATHY, UNSPECIFIED: Chronic | Status: ACTIVE | Noted: 2022-04-04

## 2022-04-08 PROBLEM — Z86.19 PERSONAL HISTORY OF OTHER INFECTIOUS AND PARASITIC DISEASES: Chronic | Status: ACTIVE | Noted: 2022-04-04

## 2022-04-08 PROBLEM — U07.1 COVID-19: Chronic | Status: ACTIVE | Noted: 2022-04-04

## 2022-04-08 PROBLEM — N12 TUBULO-INTERSTITIAL NEPHRITIS, NOT SPECIFIED AS ACUTE OR CHRONIC: Chronic | Status: ACTIVE | Noted: 2022-04-04

## 2022-04-08 PROBLEM — N20.0 CALCULUS OF KIDNEY: Chronic | Status: ACTIVE | Noted: 2022-03-29

## 2022-04-08 PROCEDURE — 52310 CYSTOSCOPY AND TREATMENT: CPT

## 2022-04-08 PROCEDURE — 99214 OFFICE O/P EST MOD 30 MIN: CPT | Mod: 25

## 2022-04-10 PROBLEM — Z46.6 ENCOUNTER FOR REMOVAL OF URETERAL STENT: Status: ACTIVE | Noted: 2022-04-10

## 2022-04-10 NOTE — HISTORY OF PRESENT ILLNESS
[FreeTextEntry1] : 46 year old female presents for follow up. \par Has some pain/discomfort from ureteral stent. \par \par Status post Left ureteroscopy, laser lithotripsy, stone extraction and ureteral stent placement on 4/4/2022 at [facility]. \par \par 1st episode of kidney stone, no family history.

## 2022-04-10 NOTE — ASSESSMENT
[FreeTextEntry1] : Ureteral stone:\par Encounter for stent removal: \par Performed cystoscopy and left ureteral stent removal today. \par \par Disorder of calcium metabolism:\par Discussed stone analysis: \par 80% Calcium oxalate monohydrate\par 20% Calcium phosphate (apatite) \par Recommended Kidney stone prevention diet:\par Good oral hydration so that urine is clear to light yellow, usually 1.5 to 2 Liters of fluids, mainly water\par Increasing Citrate in diet by consuming citrus fruits and juices- dante, limes, oranges, grapefruits and berries \par Less red meat\par Less salt\par Limit foods with oxalate like- dark green vegetables, rhubarb, chocolate, wheat bran, nuts, cranberries, and beans\par \par Will get Kidney stone metabolic work up- Ca, PTH, Uric acid and 24 Hr urine kidney stone risk profile before follow up appointment. \par Will get Renal Ultrasound and KUB before follow up appointment. \par \par Return to office in 3 months or sooner if any issues.

## 2022-04-11 DIAGNOSIS — R56.9 UNSPECIFIED CONVULSIONS: ICD-10-CM

## 2022-04-11 DIAGNOSIS — N20.1 CALCULUS OF URETER: ICD-10-CM

## 2022-04-11 DIAGNOSIS — Z79.83 LONG TERM (CURRENT) USE OF BISPHOSPHONATES: ICD-10-CM

## 2022-04-11 DIAGNOSIS — Z79.899 OTHER LONG TERM (CURRENT) DRUG THERAPY: ICD-10-CM

## 2022-04-11 DIAGNOSIS — Z79.2 LONG TERM (CURRENT) USE OF ANTIBIOTICS: ICD-10-CM

## 2022-07-28 LAB
CALCIUM SERPL-MCNC: 9 MG/DL
PARATHYROID HORMONE INTACT: 33 PG/ML
URATE SERPL-MCNC: 2.7 MG/DL

## 2022-08-03 ENCOUNTER — APPOINTMENT (OUTPATIENT)
Dept: UROLOGY | Facility: CLINIC | Age: 47
End: 2022-08-03

## 2022-08-03 VITALS — DIASTOLIC BLOOD PRESSURE: 73 MMHG | SYSTOLIC BLOOD PRESSURE: 114 MMHG

## 2022-08-03 DIAGNOSIS — N20.1 CALCULUS OF URETER: ICD-10-CM

## 2022-08-03 DIAGNOSIS — N28.89 OTHER SPECIFIED DISORDERS OF KIDNEY AND URETER: ICD-10-CM

## 2022-08-03 PROCEDURE — 99214 OFFICE O/P EST MOD 30 MIN: CPT

## 2022-08-03 NOTE — ASSESSMENT
[FreeTextEntry1] : Disorder of calcium metabolism:\par Discussed work up so far. \par Reiterated kidney stone prevention diet.  \par 24 hour urine test pending. Will inform results. \par \par Ureteral stone:\par No kidney or ureteral stone or hydronephrosis on Renal Bladder Ultrasound and X ray of Kidney, Ureter and Bladder. \par \par Ureterocele:\par Right ureterocele: no pain/discomfort, no hydronephrosis on Ultrasound. \par Mentioned did not notice that on Cystoscopy. \par Discussed treatment options. Will observe. \par \par

## 2022-08-03 NOTE — HISTORY OF PRESENT ILLNESS
[FreeTextEntry1] : 47 year old female presents for follow up. \par Denies any difficulty with urination. daytime frequency 4-5 x, nocturia 0-1 x. \par Denies dysuria, hematuria, lower abdominal or flank pain, nausea, vomiting, fever, chills or rigors. \par Had Labs and Renal Bladder Ultrasound and X ray of Kidney, Ureter and Bladder. \par \par Status post Left ureteroscopy, laser lithotripsy, stone extraction and ureteral stent placement on 4/4/2022 at [facility]. \par Stone analysis: \par 80% Calcium oxalate monohydrate\par 20% Calcium phosphate (apatite) \par 1st episode of kidney stone, no family history.

## 2022-08-09 DIAGNOSIS — E83.50 UNSPECIFIED DISORDER OF CALCIUM METABOLISM: ICD-10-CM

## 2022-08-09 LAB
STONE PLEASE NOTE:: NORMAL
STONE REVIEW: NORMAL
STONE, AMMONIUM 24 HR URINE: 24
STONE, AMMONIUM URINE: NORMAL UG/DL
STONE, BRUSHITE: 0.13 RATIO
STONE, CALCIUM 24 HR URINE: 15.3 MG/24 HR
STONE, CALCIUM OXALATE: 1 RATIO
STONE, CALCIUM URINE: 1.3 MG/DL
STONE, CHLORIDE 24 HR URINE: 55
STONE, CHLORIDE: 47 MMOL/L
STONE, CITRATE 24 HR URINE: 159 MG/24 HR
STONE, CITRATE URINE: 135 MG/L
STONE, CREATININE 24 HR URINE: 663.9 MG/24 HR
STONE, CREATININE URINE: 56.5 MG/DL
STONE, CYSTINE 24 HR URINE: 11.23 MG/24 HR
STONE, CYSTINE URINE: 9.56 MG/L
STONE, MAGNESIUM 24 HR URINE: 40 MG/24 HR
STONE, MAGNESIUM URINE: 3.4 MG/DL
STONE, OSMOLALITY: 374
STONE, OXALATE 24 HR URINE: 21 MG/24 HR
STONE, OXALATE URINE: 18 MG/L
STONE, PH URINE: 6.1
STONE, PHOSPHOROUS URINE: 33 MG/DL
STONE, PHOSPHORUS 24 HR URINE: 387.8 MG/24 HR
STONE, POTASSIUM 24 HR URINE: 51.3
STONE, POTASSIUM URINE: 43.7 MMOL/L
STONE, SODIUM 24 HR URINE: 48
STONE, SODIUM URATE: 1.21 RATIO
STONE, SODIUM URINE: 41 MMOL/L
STONE, STRUVITE: 0.01 RATIO
STONE, SULFATE 24 HR URINE: 14
STONE, SULFATE URINE: 12 MEQ/L
STONE, URIC ACID 24 HR URINE: 324 MG/24 HR
STONE, URIC ACID URINE: 0.93 RATIO
STONE, URIC ACID URINE: 27.6 MG/DL
TOT VOL (PRESERVATIVE): 1175 ML/24 HR
TOTAL VOLUME URINE: 1175 ML/24 HR

## 2022-10-06 NOTE — ASU PREOP CHECKLIST - DENTURES
Device check personally reviewed by me. Normal device function on interrogation. See scanned interrogation document for complete details. no

## 2024-02-17 NOTE — ASU DISCHARGE PLAN (ADULT/PEDIATRIC) - CALL YOUR DOCTOR IF YOU HAVE ANY OF THE FOLLOWING:
Bleeding that does not stop/Inability to tolerate liquids or foods/Nausea and vomiting that does not stop/Unable to urinate/Fever greater than (need to indicate Fahrenheit or Celsius)
1 or 2

## 2024-05-08 NOTE — PROGRESS NOTE ADULT - SUBJECTIVE AND OBJECTIVE BOX
24    Name: Mark Ortega  :1953   Sex:male   Age:71 y.o.    Chief Complaint   Patient presents with    Hypertension    Hyperlipidemia    Diabetes     Patient presents to office for visit. He did have labs done. Patient says he had one time when his glucose went down to 40 last week, the last time that happened was 6 months ago. Patient denies any medication changes. His blood pressure runs similar to today's blood pressure in the office.     Here for check up  Labs done  Reviewed at length  He has continued to remain active    Diabetes  Doing great A1c at 6.6%  Continue current medications, no side effects, weight is doing great    HTN  Readings good no issues  Continue lisinopril, metoprolol and hctz  No changes    Lipids'  elevated trigs, add fenofibrate  Continue crestor 3x/week and continue zetia with lovaza'  Continue to watch diet    Seeing endocrine for diabetes and doing so much better'  He is watching diet better and feels better'  No changes''    Declines vaccines'  Colon screening up to date      Review of Systems   Constitutional:  Negative for appetite change, fatigue and fever.   HENT:  Negative for congestion, ear pain, hearing loss, sinus pain and sore throat.    Eyes:  Negative for pain.   Respiratory:  Negative for cough, shortness of breath and wheezing.    Cardiovascular:  Negative for chest pain and leg swelling.   Gastrointestinal:  Negative for abdominal pain, constipation, diarrhea, nausea and vomiting.   Endocrine: Negative for cold intolerance and heat intolerance.   Genitourinary:  Negative for difficulty urinating, hematuria, scrotal swelling, testicular pain and urgency.   Musculoskeletal:  Negative for arthralgias, back pain, joint swelling and myalgias.   Skin:  Negative for rash and wound.   Neurological:  Negative for dizziness, syncope and light-headedness.   Hematological:  Negative for adenopathy.   Psychiatric/Behavioral:  Negative for confusion and sleep disturbance. 
CHIEF COMPLAINT/INTERVAL HISTORY:  Pt. seen and evaluated for sepsis 2/2 obstructing kidney stone and pyelonephritis.  Pt. is in no distress.  Feels well.  Denies having any flank pain.  Tolerating IV antibiotics.      REVIEW OF SYSTEMS:  No fever, CP, SOB, or abdominal pain.      Vital Signs Last 24 Hrs  T(C): 36.8 (15 Feb 2022 05:33), Max: 37.7 (14 Feb 2022 20:32)  T(F): 98.2 (15 Feb 2022 05:33), Max: 99.8 (14 Feb 2022 20:32)  HR: 94 (15 Feb 2022 05:33) (68 - 96)  BP: 118/72 (15 Feb 2022 05:33) (115/65 - 121/75)  BP(mean): --  RR: 17 (15 Feb 2022 05:33) (17 - 17)  SpO2: 92% (15 Feb 2022 05:33) (91% - 97%)    PHYSICAL EXAM:  GENERAL: NAD  HEENT: EOMI, hearing normal, conjunctiva and sclera clear  Chest: CTA bilaterally, no wheezing  CV: S1S2, RRR,   GI: soft, +BS, NT/ND  Musculoskeletal: no edema  Psychiatric: affect nL, mood nL  Skin: warm and dry    LABS:                        10.5   10.57 )-----------( 183      ( 15 Feb 2022 08:35 )             30.6     02-14    138  |  107  |  16  ----------------------------<  87  3.7   |  26  |  0.80    Ca    7.5<L>      14 Feb 2022 07:39  Phos  3.0     02-14  Mg     1.9     02-14    TPro  6.0  /  Alb  2.7<L>  /  TBili  0.2  /  DBili  x   /  AST  35  /  ALT  21  /  AlkPhos  69  02-14    PT/INR - ( 13 Feb 2022 12:05 )   PT: 13.5 sec;   INR: 1.16 ratio         PTT - ( 13 Feb 2022 12:05 )  PTT:24.5 sec      Assessment and Plan:  45y/o F. with hx of seizure disorder presenting with left flank pain and fever.  Found to have 6mm calculus with mild left hydronephrosis.  Incidentally positive for COVID 19 with no cough or SOB.      #Sepsis 2/2 obstructive kidney stone/pyelonephritis:  s/p left ureteral stent placement.  Continue Ceftriaxone 1gm IV daily.  Percocet PRN.  OR urine cx with few E. coli and blood cx NGTD.  Urology and ID f/u.      #COVID 19 infection:  Pt. asymptomatic.  Denies cough or SOB.  No need to initiate remdesivir or decadron as patient is on room air with SpO2 in 90th%.  ID f/u    #Mild enlarged right ovary:  Pt. aware of CT findings.  Will follow up with outpatient US.      #Seizure d/o:  continue Carbamazepine and Keppra.     #VTE ppx:  Lovenox 40mg SQ daily        
INTERVAL HPI/OVERNIGHT EVENTS: Afebrile overnight    MEDICATIONS  (STANDING):  carBAMazepine 500 milliGRAM(s) Oral <User Schedule>  carBAMazepine 400 milliGRAM(s) Oral <User Schedule>  carBAMazepine 600 milliGRAM(s) Oral <User Schedule>  cefTRIAXone   IVPB 1000 milliGRAM(s) IV Intermittent every 24 hours  enoxaparin Injectable 40 milliGRAM(s) SubCutaneous daily  levETIRAcetam 1000 milliGRAM(s) Oral <User Schedule>  phenazopyridine 200 milliGRAM(s) Oral three times a day    MEDICATIONS  (PRN):  acetaminophen     Tablet .. 650 milliGRAM(s) Oral every 6 hours PRN Temp greater or equal to 38C (100.4F), Mild Pain (1 - 3)  oxycodone    5 mG/acetaminophen 325 mG 1 Tablet(s) Oral every 6 hours PRN Moderate Pain (4 - 6)  oxycodone    5 mG/acetaminophen 325 mG 2 Tablet(s) Oral every 6 hours PRN Severe Pain (7 - 10)        Vital Signs Last 24 Hrs  T(C): 36.4 (2022 05:25), Max: 40.6 (2022 11:00)  T(F): 97.6 (2022 05:25), Max: 105.1 (2022 11:00)  HR: 90 (2022 05:25) (74 - 128)  BP: 117/73 (2022 05:25) (100/64 - 124/78)  BP(mean): --  RR: 17 (2022 05:25) (15 - 20)  SpO2: 94% (2022 05:25) (94% - 100%)    PHYSICAL EXAM:    ABDOMEN: No CVA or abdominal tenderness    LABS:                        13.2   16.31 )-----------( 245      ( 2022 08:55 )             38.3     02-13    134<L>  |  101  |  14  ----------------------------<  114<H>  4.2   |  27  |  1.10    Ca    8.6      2022 08:55    TPro  8.1  /  Alb  3.7  /  TBili  0.6  /  DBili  x   /  AST  23  /  ALT  16  /  AlkPhos  73  02-13    PT/INR - ( 2022 12:05 )   PT: 13.5 sec;   INR: 1.16 ratio         PTT - ( 2022 12:05 )  PTT:24.5 sec  Urinalysis Basic - ( 2022 08:53 )    Color: Yellow / Appearance: Slightly Turbid / S.020 / pH: x  Gluc: x / Ketone: Moderate  / Bili: Negative / Urobili: Negative   Blood: x / Protein: 30 mg/dL / Nitrite: Negative   Leuk Esterase: Moderate / RBC: 3-5 /HPF / WBC >50   Sq Epi: x / Non Sq Epi: Moderate / Bacteria: Moderate        
CHIEF COMPLAINT/INTERVAL HISTORY:  Pt. seen and evaluated for sepsis 2/2 obstructing kidney stone and pyelonephritis.   Pt. is s/p emergent ureteral stent placement yesterday.  In no distress.  Denies having flank pain.  Tolerating IV antibiotics.  Had fever 105.1 yesterday.      REVIEW OF SYSTEMS:  +fever.  Denies SOB, CP, or abdominal pain    Vital Signs Last 24 Hrs  T(C): 36.4 (2022 05:25), Max: 40.6 (2022 11:00)  T(F): 97.6 (2022 05:25), Max: 105.1 (2022 11:00)  HR: 90 (2022 05:25) (74 - 128)  BP: 117/73 (2022 05:25) (100/64 - 121/59)  BP(mean): --  RR: 17 (2022 05:25) (15 - 20)  SpO2: 94% (2022 05:25) (94% - 99%)    PHYSICAL EXAM:  GENERAL: NAD  HEENT: EOMI, hearing normal, conjunctiva and sclera clear  Chest: CTA bilaterally, no wheezing  CV: S1S2, RRR,   GI: soft, +BS, NT/ND  Musculoskeletal: no edema  Psychiatric: affect nL, mood nL  Skin: warm and dry    LABS:                        13.2   16.31 )-----------( 245      ( 2022 08:55 )             38.3     02-14    138  |  107  |  16  ----------------------------<  87  3.7   |  26  |  0.80    Ca    7.5<L>      2022 07:39  Phos  3.0     02-14  Mg     1.9     02-14    TPro  6.0  /  Alb  2.7<L>  /  TBili  0.2  /  DBili  x   /  AST  35  /  ALT  21  /  AlkPhos  69  02-14    PT/INR - ( 2022 12:05 )   PT: 13.5 sec;   INR: 1.16 ratio         PTT - ( 2022 12:05 )  PTT:24.5 sec  Urinalysis Basic - ( 2022 08:53 )    Color: Yellow / Appearance: Slightly Turbid / S.020 / pH: x  Gluc: x / Ketone: Moderate  / Bili: Negative / Urobili: Negative   Blood: x / Protein: 30 mg/dL / Nitrite: Negative   Leuk Esterase: Moderate / RBC: 3-5 /HPF / WBC >50   Sq Epi: x / Non Sq Epi: Moderate / Bacteria: Moderate        Assessment and Plan:  47y/o F. with hx of seizure disorder presenting with left flank pain and fever.  Found to have 6mm calculus with mild left hydronephrosis.  Incidentally positive for COVID 19 with no cough or SOB.      #Sepsis 2/2 obstructive kidney stone/pyelonephritis:  s/p left ureteral stent placement.  Continue Ceftriaxone 1gm IV daily.  Percocet PRN.  Check urine cx and blood cx.  Urology and ID f/u.      #COVID 19 infection:  Pt. asymptomatic.  Denies cough or SOB.  No need to initiate remdesivir or decadron as patient is on room air with SpO2 in 90th%.  ID f/u    #Mild enlarged right ovary:  Pt. aware of CT findings.  Will follow up with outpatient US.      #Seizure d/o:  continue Carbamazepine and Keppra.     #VTE ppx:  Lovenox 40mg SQ daily    
[facility]  INFECTIOUS DISEASES - [address], [place] [address]  Tel: [phone]     Fax: [phone]  =======================================================    EMMETT CHANCE [id number]    Follow up: No fevers. Feels better overal. Denies any pain, cough or SOB.     Allergies:  No Known Allergies      Antibiotics:  acetaminophen     Tablet .. 650 milliGRAM(s) Oral every 6 hours PRN  carBAMazepine 500 milliGRAM(s) Oral <User Schedule>  carBAMazepine 400 milliGRAM(s) Oral <User Schedule>  carBAMazepine 600 milliGRAM(s) Oral <User Schedule>  cefTRIAXone   IVPB 1000 milliGRAM(s) IV Intermittent every 24 hours  enoxaparin Injectable 40 milliGRAM(s) SubCutaneous daily  levETIRAcetam 1000 milliGRAM(s) Oral <User Schedule>  oxycodone    5 mG/acetaminophen 325 mG 1 Tablet(s) Oral every 6 hours PRN  oxycodone    5 mG/acetaminophen 325 mG 2 Tablet(s) Oral every 6 hours PRN  phenazopyridine 200 milliGRAM(s) Oral three times a day  saccharomyces boulardii 250 milliGRAM(s) Oral two times a day       REVIEW OF SYSTEMS:  CONSTITUTIONAL: No fevers  HEENT:  No loss of smell/taste.  No sore throat or runny nose.  CARDIOVASCULAR:  No chest pain or SOB.  RESPIRATORY:  No cough, shortness of breath,   GASTROINTESTINAL:  No nausea, vomiting or diarrhea.  MUSCULOSKELETAL:  no joint aches  NEUROLOGIC:  No headache  PSYCHIATRIC:  No disorder of thought or mood.     Physical Exam:  ICU Vital Signs Last 24 Hrs  T(C): 37 (15 Feb 2022 12:25), Max: 37.7 (14 Feb 2022 20:32)  T(F): 98.6 (15 Feb 2022 12:25), Max: 99.8 (14 Feb 2022 20:32)  HR: 72 (15 Feb 2022 12:25) (68 - 96)  BP: 142/85 (15 Feb 2022 12:25) (115/65 - 142/85)  BP(mean): --  ABP: --  ABP(mean): --  RR: 17 (15 Feb 2022 12:25) (17 - 17)  SpO2: 95% (15 Feb 2022 12:25) (91% - 97%)     GEN: NAD  HEENT: normocephalic and atraumatic.   NECK: Supple.   LUNGS: normal respiratory effort  HEART: regular rate and rhythm  ABDOMEN: Soft, nontender, and nondistended.   EXTREMITIES: No leg edema.  NEUROLOGIC: grossly intact.  PSYCHIATRIC: Appropriate affect .    Labs:  02-15    140  |  107  |  11  ----------------------------<  107<H>  3.5   |  27  |  0.62    Ca    7.4<L>      15 Feb 2022 08:35  Phos  3.0     02-14  Mg     1.9     02-14    TPro  6.0  /  Alb  2.7<L>  /  TBili  0.2  /  DBili  x   /  AST  35  /  ALT  21  /  AlkPhos  69  02-14                          10.5   10.57 )-----------( 183      ( 15 Feb 2022 08:35 )             30.6         LIVER FUNCTIONS - ( 14 Feb 2022 07:39 )  Alb: 2.7 g/dL / Pro: 6.0 g/dL / ALK PHOS: 69 U/L / ALT: 21 U/L / AST: 35 U/L / GGT: x             RECENT CULTURES:  02-13 @ 18:11 Kidney Kidney     Few Escherichia coli        02-13 @ 12:22 .Blood Blood-Peripheral     No growth to date.              All imaging and data are reviewed.   
[facility]  INFECTIOUS DISEASES - Tomas Jerez, [place]  [address] [place] [address]  Tel: [phone]     Fax: [phone]  =======================================================    EMMETT CHANCE [id number]    Follow up: No fever overnight, Tmax of 99.5. Feels better today, L flank pain much improved. Notes blood tinge on urine. Denies any cough or SOB.    Allergies:  No Known Allergies      Antibiotics:  acetaminophen     Tablet .. 650 milliGRAM(s) Oral every 6 hours PRN  carBAMazepine 500 milliGRAM(s) Oral <User Schedule>  carBAMazepine 400 milliGRAM(s) Oral <User Schedule>  carBAMazepine 600 milliGRAM(s) Oral <User Schedule>  cefTRIAXone   IVPB 1000 milliGRAM(s) IV Intermittent every 24 hours  enoxaparin Injectable 40 milliGRAM(s) SubCutaneous daily  levETIRAcetam 1000 milliGRAM(s) Oral <User Schedule>  oxycodone    5 mG/acetaminophen 325 mG 1 Tablet(s) Oral every 6 hours PRN  oxycodone    5 mG/acetaminophen 325 mG 2 Tablet(s) Oral every 6 hours PRN  phenazopyridine 200 milliGRAM(s) Oral three times a day  saccharomyces boulardii 250 milliGRAM(s) Oral two times a day       REVIEW OF SYSTEMS:  CONSTITUTIONAL:  (+) fever and chills, (+) fatirgue  HEENT:  No loss of smell/taste.  No sore throat or runny nose.  CARDIOVASCULAR:  No chest pain or SOB.  RESPIRATORY:  No cough, shortness of breath,   GASTROINTESTINAL:  No nausea, vomiting or diarrhea.  GENITOURINARY:  see history  MUSCULOSKELETAL:  no joint aches  NEUROLOGIC:  No headache  PSYCHIATRIC:  No disorder of thought or mood.       Physical Exam:  ICU Vital Signs Last 24 Hrs  T(C): 36.4 (2022 05:25), Max: 37.5 (2022 14:30)  T(F): 97.6 (2022 05:25), Max: 99.5 (2022 14:30)  HR: 90 (2022 05:25) (74 - 110)  BP: 117/73 (2022 05:25) (100/64 - 121/59)  BP(mean): --  ABP: --  ABP(mean): --  RR: 17 (2022 05:25) (15 - 17)  SpO2: 94% (2022 05:25) (94% - 99%)     GEN: NAD  HEENT: normocephalic and atraumatic.   NECK: Supple.   LUNGS: normal respiratory effort  HEART: regular rate and rhythm  ABDOMEN: Soft, nontender, and nondistended.   EXTREMITIES: No leg edema.  NEUROLOGIC: grossly intact.  PSYCHIATRIC: Appropriate affect .      Labs:      138  |  107  |  16  ----------------------------<  87  3.7   |  26  |  0.80    Ca    7.5<L>      2022 07:39  Phos  3.0       Mg     1.9         TPro  6.0  /  Alb  2.7<L>  /  TBili  0.2  /  DBili  x   /  AST  35  /  ALT  21  /  AlkPhos  69  14                          10.1   23.05 )-----------( 186      ( 2022 07:39 )             29.8     PT/INR - ( 2022 12:05 )   PT: 13.5 sec;   INR: 1.16 ratio         PTT - ( 2022 12:05 )  PTT:24.5 sec  Urinalysis Basic - ( 2022 08:53 )    Color: Yellow / Appearance: Slightly Turbid / S.020 / pH: x  Gluc: x / Ketone: Moderate  / Bili: Negative / Urobili: Negative   Blood: x / Protein: 30 mg/dL / Nitrite: Negative   Leuk Esterase: Moderate / RBC: 3-5 /HPF / WBC >50   Sq Epi: x / Non Sq Epi: Moderate / Bacteria: Moderate      LIVER FUNCTIONS - ( 2022 07:39 )  Alb: 2.7 g/dL / Pro: 6.0 g/dL / ALK PHOS: 69 U/L / ALT: 21 U/L / AST: 35 U/L / GGT: x             RECENT CULTURES:   @ 12:22 .Blood Blood-Peripheral     No growth to date.              All imaging and data are reviewed.   
INTERVAL HPI/OVERNIGHT EVENTS: Afebrile. no flank pain.    MEDICATIONS  (STANDING):  carBAMazepine 500 milliGRAM(s) Oral <User Schedule>  carBAMazepine 400 milliGRAM(s) Oral <User Schedule>  carBAMazepine 600 milliGRAM(s) Oral <User Schedule>  cefTRIAXone   IVPB 1000 milliGRAM(s) IV Intermittent every 24 hours  enoxaparin Injectable 40 milliGRAM(s) SubCutaneous daily  levETIRAcetam 1000 milliGRAM(s) Oral <User Schedule>  phenazopyridine 200 milliGRAM(s) Oral three times a day  saccharomyces boulardii 250 milliGRAM(s) Oral two times a day    MEDICATIONS  (PRN):  acetaminophen     Tablet .. 650 milliGRAM(s) Oral every 6 hours PRN Temp greater or equal to 38C (100.4F), Mild Pain (1 - 3)  oxycodone    5 mG/acetaminophen 325 mG 1 Tablet(s) Oral every 6 hours PRN Moderate Pain (4 - 6)  oxycodone    5 mG/acetaminophen 325 mG 2 Tablet(s) Oral every 6 hours PRN Severe Pain (7 - 10)        Vital Signs Last 24 Hrs  T(C): 36.8 (15 Feb 2022 05:33), Max: 37.7 (2022 20:32)  T(F): 98.2 (15 Feb 2022 05:33), Max: 99.8 (2022 20:32)  HR: 94 (15 Feb 2022 05:33) (68 - 96)  BP: 118/72 (15 Feb 2022 05:33) (115/65 - 121/75)  BP(mean): --  RR: 17 (15 Feb 2022 05:33) (17 - 17)  SpO2: 92% (15 Feb 2022 05:33) (91% - 97%)    PHYSICAL EXAM:    ABDOMEN: SOft. non tender. no CVA tenderness    LABS:                        10.8   x     )-----------( x        ( 2022 14:11 )             32.1     02-14    138  |  107  |  16  ----------------------------<  87  3.7   |  26  |  0.80    Ca    7.5<L>      2022 07:39  Phos  3.0     02-14  Mg     1.9     -14    TPro  6.0  /  Alb  2.7<L>  /  TBili  0.2  /  DBili  x   /  AST  35  /  ALT  21  /  AlkPhos  69  02-14    PT/INR - ( 2022 12:05 )   PT: 13.5 sec;   INR: 1.16 ratio         PTT - ( 2022 12:05 )  PTT:24.5 sec  Urinalysis Basic - ( 2022 08:53 )    Color: Yellow / Appearance: Slightly Turbid / S.020 / pH: x  Gluc: x / Ketone: Moderate  / Bili: Negative / Urobili: Negative   Blood: x / Protein: 30 mg/dL / Nitrite: Negative   Leuk Esterase: Moderate / RBC: 3-5 /HPF / WBC >50   Sq Epi: x / Non Sq Epi: Moderate / Bacteria: Moderate      Urine culture:   @ 18:11 --   Few Escherichia coli  Urine culture:   @ 12:22 --   No growth to date.

## 2025-04-25 NOTE — PATIENT PROFILE ADULT - FALL HARM RISK - UNIVERSAL INTERVENTIONS
07:30
Bed in lowest position, wheels locked, appropriate side rails in place/Call bell, personal items and telephone in reach/Instruct patient to call for assistance before getting out of bed or chair/Non-slip footwear when patient is out of bed/[place] to call system/Physically safe environment - no spills, clutter or unnecessary equipment/Purposeful Proactive Rounding/Room/bathroom lighting operational, light cord in reach

## 2025-07-22 ENCOUNTER — APPOINTMENT (OUTPATIENT)
Dept: NEUROLOGY | Facility: CLINIC | Age: 50
End: 2025-07-22
Payer: COMMERCIAL

## 2025-07-22 ENCOUNTER — TRANSCRIPTION ENCOUNTER (OUTPATIENT)
Age: 50
End: 2025-07-22

## 2025-07-22 VITALS
RESPIRATION RATE: 15 BRPM | SYSTOLIC BLOOD PRESSURE: 152 MMHG | HEIGHT: 64 IN | BODY MASS INDEX: 23.05 KG/M2 | WEIGHT: 135 LBS | HEART RATE: 86 BPM | DIASTOLIC BLOOD PRESSURE: 79 MMHG

## 2025-07-22 DIAGNOSIS — G40.802 OTHER EPILEPSY, NOT INTRACTABLE, W/OUT STATUS EPILEPTICUS: ICD-10-CM

## 2025-07-22 DIAGNOSIS — E83.50 UNSPECIFIED DISORDER OF CALCIUM METABOLISM: ICD-10-CM

## 2025-07-22 DIAGNOSIS — G40.109 LOCALIZATION-RELATED (FOCAL) (PARTIAL) SYMPTOMATIC EPILEPSY AND EPILEPTIC SYNDROMES WITH SIMPLE PARTIAL SEIZURES, NOT INTRACTABLE, W/OUT STATUS EPILEPTICUS: ICD-10-CM

## 2025-07-22 PROCEDURE — 99204 OFFICE O/P NEW MOD 45 MIN: CPT

## 2025-07-22 RX ORDER — ROSUVASTATIN CALCIUM 10 MG/1
10 TABLET, FILM COATED ORAL
Qty: 90 | Refills: 0 | Status: ACTIVE | COMMUNITY
Start: 2025-07-22

## 2025-07-22 RX ORDER — CARBAMAZEPINE 200 MG/1
200 TABLET ORAL
Refills: 0 | Status: ACTIVE | COMMUNITY

## 2025-07-22 RX ORDER — CARBAMAZEPINE 200 MG/1
200 TABLET ORAL
Qty: 675 | Refills: 1 | Status: ACTIVE | COMMUNITY
Start: 2025-07-22 | End: 1900-01-01

## 2025-07-22 RX ORDER — LEVETIRACETAM 500 MG/1
500 TABLET, FILM COATED ORAL
Refills: 0 | Status: ACTIVE | COMMUNITY

## 2025-07-22 RX ORDER — LEVETIRACETAM 500 MG/1
500 TABLET, FILM COATED ORAL
Qty: 180 | Refills: 1 | Status: ACTIVE | COMMUNITY
Start: 2025-07-22 | End: 1900-01-01

## 2025-07-23 ENCOUNTER — TRANSCRIPTION ENCOUNTER (OUTPATIENT)
Age: 50
End: 2025-07-23

## 2025-07-28 ENCOUNTER — TRANSCRIPTION ENCOUNTER (OUTPATIENT)
Age: 50
End: 2025-07-28

## 2025-07-28 LAB
ALBUMIN SERPL ELPH-MCNC: 4.7 G/DL
ALP BLD-CCNC: 90 U/L
ALT SERPL-CCNC: 21 U/L
ANION GAP SERPL CALC-SCNC: 12 MMOL/L
AST SERPL-CCNC: 25 U/L
BILIRUB SERPL-MCNC: 0.2 MG/DL
BUN SERPL-MCNC: 18 MG/DL
CALCIUM SERPL-MCNC: 9.5 MG/DL
CARBAMAZEPINE SERPL-MCNC: 12.9 UG/ML
CHLORIDE SERPL-SCNC: 101 MMOL/L
CO2 SERPL-SCNC: 28 MMOL/L
CREAT SERPL-MCNC: 0.65 MG/DL
EGFRCR SERPLBLD CKD-EPI 2021: 106 ML/MIN/1.73M2
GLUCOSE SERPL-MCNC: 74 MG/DL
HCT VFR BLD CALC: 42.3 %
HGB BLD-MCNC: 14 G/DL
LEVETIRACETAM SERPL-MCNC: 28.2 UG/ML
MCHC RBC-ENTMCNC: 30.3 PG
MCHC RBC-ENTMCNC: 33.1 G/DL
MCV RBC AUTO: 91.6 FL
PLATELET # BLD AUTO: 272 K/UL
POTASSIUM SERPL-SCNC: 4.5 MMOL/L
PROT SERPL-MCNC: 7.8 G/DL
RBC # BLD: 4.62 M/UL
RBC # FLD: 12.6 %
SODIUM SERPL-SCNC: 141 MMOL/L
WBC # FLD AUTO: 4.41 K/UL

## 2025-08-07 ENCOUNTER — OUTPATIENT (OUTPATIENT)
Dept: OUTPATIENT SERVICES | Facility: HOSPITAL | Age: 50
LOS: 1 days | End: 2025-08-07
Payer: COMMERCIAL

## 2025-08-07 ENCOUNTER — APPOINTMENT (OUTPATIENT)
Dept: RADIOLOGY | Facility: CLINIC | Age: 50
End: 2025-08-07

## 2025-08-07 DIAGNOSIS — Z98.891 HISTORY OF UTERINE SCAR FROM PREVIOUS SURGERY: Chronic | ICD-10-CM

## 2025-08-07 DIAGNOSIS — E83.50 UNSPECIFIED DISORDER OF CALCIUM METABOLISM: ICD-10-CM

## 2025-08-07 DIAGNOSIS — Z98.890 OTHER SPECIFIED POSTPROCEDURAL STATES: Chronic | ICD-10-CM

## 2025-08-07 PROCEDURE — 77080 DXA BONE DENSITY AXIAL: CPT | Mod: 26

## 2025-08-07 PROCEDURE — 77080 DXA BONE DENSITY AXIAL: CPT

## 2025-08-08 ENCOUNTER — APPOINTMENT (OUTPATIENT)
Dept: NEUROLOGY | Facility: CLINIC | Age: 50
End: 2025-08-08
Payer: COMMERCIAL

## 2025-08-08 ENCOUNTER — TRANSCRIPTION ENCOUNTER (OUTPATIENT)
Age: 50
End: 2025-08-08

## 2025-08-08 PROCEDURE — 95816 EEG AWAKE AND DROWSY: CPT

## 2025-08-08 PROCEDURE — 93040 RHYTHM ECG WITH REPORT: CPT

## 2025-08-11 ENCOUNTER — TRANSCRIPTION ENCOUNTER (OUTPATIENT)
Age: 50
End: 2025-08-11

## (undated) DEVICE — SOL IRR BAG H2O 3000ML

## (undated) DEVICE — SOL IRR POUR H2O 1000ML

## (undated) DEVICE — GOWN LG

## (undated) DEVICE — PLV-SCD MACHINE: Type: DURABLE MEDICAL EQUIPMENT

## (undated) DEVICE — WARMING BLANKET UPPER ADULT

## (undated) DEVICE — PACK CYSTO

## (undated) DEVICE — GLV 7.5 PROTEXIS (WHITE)

## (undated) DEVICE — VENODYNE/SCD SLEEVE CALF LARGE

## (undated) DEVICE — VENODYNE/SCD SLEEVE CALF MEDIUM

## (undated) DEVICE — DRAPE DRAINAGE BAG URO CATCH II